# Patient Record
Sex: MALE | Race: WHITE | NOT HISPANIC OR LATINO
[De-identification: names, ages, dates, MRNs, and addresses within clinical notes are randomized per-mention and may not be internally consistent; named-entity substitution may affect disease eponyms.]

---

## 2017-06-20 ENCOUNTER — APPOINTMENT (OUTPATIENT)
Dept: OPHTHALMOLOGY | Facility: CLINIC | Age: 82
End: 2017-06-20

## 2017-06-20 ENCOUNTER — LABORATORY RESULT (OUTPATIENT)
Age: 82
End: 2017-06-20

## 2017-07-05 ENCOUNTER — APPOINTMENT (OUTPATIENT)
Dept: OPHTHALMOLOGY | Facility: CLINIC | Age: 82
End: 2017-07-05

## 2017-08-09 ENCOUNTER — APPOINTMENT (OUTPATIENT)
Dept: OPHTHALMOLOGY | Facility: CLINIC | Age: 82
End: 2017-08-09
Payer: COMMERCIAL

## 2017-08-09 PROCEDURE — 92012 INTRM OPH EXAM EST PATIENT: CPT

## 2017-08-09 PROCEDURE — 92134 CPTRZ OPH DX IMG PST SGM RTA: CPT

## 2017-09-14 ENCOUNTER — APPOINTMENT (OUTPATIENT)
Dept: OPHTHALMOLOGY | Facility: CLINIC | Age: 82
End: 2017-09-14
Payer: COMMERCIAL

## 2017-09-14 PROCEDURE — 92226: CPT | Mod: LT

## 2017-09-14 PROCEDURE — 92134 CPTRZ OPH DX IMG PST SGM RTA: CPT

## 2017-09-14 PROCEDURE — 92012 INTRM OPH EXAM EST PATIENT: CPT

## 2017-10-19 ENCOUNTER — APPOINTMENT (OUTPATIENT)
Dept: OPHTHALMOLOGY | Facility: CLINIC | Age: 82
End: 2017-10-19
Payer: COMMERCIAL

## 2017-10-19 PROCEDURE — 92012 INTRM OPH EXAM EST PATIENT: CPT

## 2017-11-16 ENCOUNTER — APPOINTMENT (OUTPATIENT)
Dept: OPHTHALMOLOGY | Facility: CLINIC | Age: 82
End: 2017-11-16
Payer: COMMERCIAL

## 2017-11-16 PROCEDURE — 92012 INTRM OPH EXAM EST PATIENT: CPT

## 2017-11-16 PROCEDURE — 92134 CPTRZ OPH DX IMG PST SGM RTA: CPT

## 2017-11-16 PROCEDURE — 92226: CPT | Mod: RT

## 2017-12-15 ENCOUNTER — APPOINTMENT (OUTPATIENT)
Dept: OPHTHALMOLOGY | Facility: CLINIC | Age: 82
End: 2017-12-15
Payer: COMMERCIAL

## 2017-12-15 DIAGNOSIS — H35.372 PUCKERING OF MACULA, LEFT EYE: ICD-10-CM

## 2017-12-15 DIAGNOSIS — H35.352 CYSTOID MACULAR DEGENERATION, LEFT EYE: ICD-10-CM

## 2017-12-15 DIAGNOSIS — H35.3130 NONEXUDATIVE AGE-RELATED MACULAR DEGENERATION, BILATERAL, STAGE UNSPECIFIED: ICD-10-CM

## 2017-12-15 DIAGNOSIS — H34.12 CENTRAL RETINAL ARTERY OCCLUSION, LEFT EYE: ICD-10-CM

## 2017-12-15 DIAGNOSIS — Z96.1 PRESENCE OF INTRAOCULAR LENS: ICD-10-CM

## 2017-12-15 DIAGNOSIS — H43.813 VITREOUS DEGENERATION, BILATERAL: ICD-10-CM

## 2017-12-15 PROCEDURE — 92012 INTRM OPH EXAM EST PATIENT: CPT

## 2018-01-05 VITALS
TEMPERATURE: 97 F | WEIGHT: 179.9 LBS | OXYGEN SATURATION: 96 % | SYSTOLIC BLOOD PRESSURE: 161 MMHG | HEART RATE: 81 BPM | DIASTOLIC BLOOD PRESSURE: 100 MMHG | RESPIRATION RATE: 16 BRPM

## 2018-01-05 LAB
ALBUMIN SERPL ELPH-MCNC: 3.6 G/DL — SIGNIFICANT CHANGE UP (ref 3.3–5)
ALP SERPL-CCNC: 119 U/L — SIGNIFICANT CHANGE UP (ref 40–120)
ALT FLD-CCNC: 9 U/L — LOW (ref 10–45)
ANION GAP SERPL CALC-SCNC: 18 MMOL/L — HIGH (ref 5–17)
AST SERPL-CCNC: 27 U/L — SIGNIFICANT CHANGE UP (ref 10–40)
BASE EXCESS BLDV CALC-SCNC: 0.8 MMOL/L — SIGNIFICANT CHANGE UP
BASOPHILS NFR BLD AUTO: 0.2 % — SIGNIFICANT CHANGE UP (ref 0–2)
BILIRUB SERPL-MCNC: 0.6 MG/DL — SIGNIFICANT CHANGE UP (ref 0.2–1.2)
BUN SERPL-MCNC: 25 MG/DL — HIGH (ref 7–23)
CA-I SERPL-SCNC: 1.13 MMOL/L — SIGNIFICANT CHANGE UP (ref 1.12–1.3)
CALCIUM SERPL-MCNC: 9.7 MG/DL — SIGNIFICANT CHANGE UP (ref 8.4–10.5)
CHLORIDE SERPL-SCNC: 103 MMOL/L — SIGNIFICANT CHANGE UP (ref 96–108)
CK MB CFR SERPL CALC: 4.5 NG/ML — SIGNIFICANT CHANGE UP (ref 0–6.7)
CK SERPL-CCNC: 485 U/L — HIGH (ref 30–200)
CO2 SERPL-SCNC: 24 MMOL/L — SIGNIFICANT CHANGE UP (ref 22–31)
CREAT SERPL-MCNC: 1.13 MG/DL — SIGNIFICANT CHANGE UP (ref 0.5–1.3)
EOSINOPHIL NFR BLD AUTO: 0.3 % — SIGNIFICANT CHANGE UP (ref 0–6)
ETHANOL SERPL-MCNC: <10 MG/DL — SIGNIFICANT CHANGE UP (ref 0–10)
GAS PNL BLDV: 142 MMOL/L — SIGNIFICANT CHANGE UP (ref 138–146)
GAS PNL BLDV: SIGNIFICANT CHANGE UP
GAS PNL BLDV: SIGNIFICANT CHANGE UP
GLUCOSE SERPL-MCNC: 102 MG/DL — HIGH (ref 70–99)
HCO3 BLDV-SCNC: 25 MMOL/L — SIGNIFICANT CHANGE UP (ref 20–27)
HCT VFR BLD CALC: 35.8 % — LOW (ref 39–50)
HGB BLD-MCNC: 11.6 G/DL — LOW (ref 13–17)
LACTATE SERPL-SCNC: 1.2 MMOL/L — SIGNIFICANT CHANGE UP (ref 0.5–2)
LYMPHOCYTES # BLD AUTO: 12.9 % — LOW (ref 13–44)
MCHC RBC-ENTMCNC: 28.6 PG — SIGNIFICANT CHANGE UP (ref 27–34)
MCHC RBC-ENTMCNC: 32.4 G/DL — SIGNIFICANT CHANGE UP (ref 32–36)
MCV RBC AUTO: 88.4 FL — SIGNIFICANT CHANGE UP (ref 80–100)
MONOCYTES NFR BLD AUTO: 11 % — SIGNIFICANT CHANGE UP (ref 2–14)
NEUTROPHILS NFR BLD AUTO: 75.6 % — SIGNIFICANT CHANGE UP (ref 43–77)
PCO2 BLDV: 38 MMHG — LOW (ref 41–51)
PH BLDV: 7.43 — SIGNIFICANT CHANGE UP (ref 7.32–7.43)
PLATELET # BLD AUTO: 351 K/UL — SIGNIFICANT CHANGE UP (ref 150–400)
PO2 BLDV: 74 MMHG — SIGNIFICANT CHANGE UP
POTASSIUM BLDV-SCNC: 3.6 MMOL/L — SIGNIFICANT CHANGE UP (ref 3.5–4.9)
POTASSIUM SERPL-MCNC: 3.7 MMOL/L — SIGNIFICANT CHANGE UP (ref 3.5–5.3)
POTASSIUM SERPL-SCNC: 3.7 MMOL/L — SIGNIFICANT CHANGE UP (ref 3.5–5.3)
PROT SERPL-MCNC: 7.7 G/DL — SIGNIFICANT CHANGE UP (ref 6–8.3)
RBC # BLD: 4.05 M/UL — LOW (ref 4.2–5.8)
RBC # FLD: 14.8 % — SIGNIFICANT CHANGE UP (ref 10.3–16.9)
SAO2 % BLDV: 94 % — SIGNIFICANT CHANGE UP
SODIUM SERPL-SCNC: 145 MMOL/L — SIGNIFICANT CHANGE UP (ref 135–145)
TROPONIN T SERPL-MCNC: 0.02 NG/ML — HIGH (ref 0–0.01)
WBC # BLD: 12.1 K/UL — HIGH (ref 3.8–10.5)
WBC # FLD AUTO: 12.1 K/UL — HIGH (ref 3.8–10.5)

## 2018-01-05 PROCEDURE — 70450 CT HEAD/BRAIN W/O DYE: CPT | Mod: 26

## 2018-01-05 PROCEDURE — 71045 X-RAY EXAM CHEST 1 VIEW: CPT | Mod: 26

## 2018-01-05 PROCEDURE — 71260 CT THORAX DX C+: CPT | Mod: 26

## 2018-01-05 PROCEDURE — 73110 X-RAY EXAM OF WRIST: CPT | Mod: 26

## 2018-01-05 PROCEDURE — 74177 CT ABD & PELVIS W/CONTRAST: CPT | Mod: 26

## 2018-01-05 PROCEDURE — 73502 X-RAY EXAM HIP UNI 2-3 VIEWS: CPT | Mod: 26,RT

## 2018-01-05 PROCEDURE — 99291 CRITICAL CARE FIRST HOUR: CPT | Mod: 25

## 2018-01-05 PROCEDURE — 93010 ELECTROCARDIOGRAM REPORT: CPT

## 2018-01-05 PROCEDURE — 93010 ELECTROCARDIOGRAM REPORT: CPT | Mod: 77

## 2018-01-05 RX ORDER — TETANUS TOXOID, REDUCED DIPHTHERIA TOXOID AND ACELLULAR PERTUSSIS VACCINE, ADSORBED 5; 2.5; 8; 8; 2.5 [IU]/.5ML; [IU]/.5ML; UG/.5ML; UG/.5ML; UG/.5ML
0.5 SUSPENSION INTRAMUSCULAR ONCE
Qty: 0 | Refills: 0 | Status: COMPLETED | OUTPATIENT
Start: 2018-01-05 | End: 2018-01-05

## 2018-01-05 RX ORDER — SODIUM CHLORIDE 9 MG/ML
1000 INJECTION INTRAMUSCULAR; INTRAVENOUS; SUBCUTANEOUS ONCE
Qty: 0 | Refills: 0 | Status: COMPLETED | OUTPATIENT
Start: 2018-01-05 | End: 2018-01-05

## 2018-01-05 RX ADMIN — SODIUM CHLORIDE 1000 MILLILITER(S): 9 INJECTION INTRAMUSCULAR; INTRAVENOUS; SUBCUTANEOUS at 22:24

## 2018-01-05 NOTE — ED PROVIDER NOTE - SHIFT CHANGE DETAILS
await CT head/ chest abdomen-  if neg for acute process may admit to medicine - mild trop leak  no acute ekg changes- pt is stable for floor

## 2018-01-05 NOTE — ED PROVIDER NOTE - CRITICAL CARE PROVIDED
interpretation of diagnostic studies/telephone consultation with the patient's family/additional history taking/consultation with other physicians/direct patient care (not related to procedure)/documentation

## 2018-01-05 NOTE — ED PROVIDER NOTE - CRANIAL NERVE AND PUPILLARY EXAM
corneal reflex intact/peripheral vision intact/cranial nerves 2-12 intact/central and peripheral vision intact/extra-ocular movements intact/gag reflex intact/tongue is midline

## 2018-01-05 NOTE — ED PROVIDER NOTE - OBJECTIVE STATEMENT
85 yo male with hx of prostate cancer- last seen nl 2 days ago per Super- found on floor with stool on his underwear  no headache or N/V  no abd pain? fall ? -- no prior known hx of CVA or MI -- ? treatment for alleged prostate cancer ?  alert to person, not place or time- noted abrasion to right buttocks-  can move all ext x 4--  no facial droop no focal weakness  last tet? 83 yo male lives alone-but --with hx of ? prostate cancer- last seen nl 2 days ago per Super- found on floor with stool on his underwear  no headache or N/V  no abd pain? fall ? -- no prior known hx of CVA or MI -- ? treatment for alleged prostate cancer ?  alert to person, not place or time- noted abrasion to right buttocks-  can move all ext x 4--  no facial droop no focal weakness --- pt claims he fell 2 weeks ago- injured his wrist and hit his head ? loc ? last tet 83 yo male lives alone-but --with hx of ? prostate cancer- last seen nl 2 days ago per Super- found on floor with stool on his underwear  no headache or N/V  no abd pain? fall ? -- no prior known hx of CVA or MI -- ? treatment for alleged prostate cancer ?  alert to person, not place or time- noted abrasion to right buttocks-  can move all ext x 4--  no facial droop no focal weakness --- pt claims he fell 2 weeks ago- injured his wrist and hit his head ? loc ? last tet  -no prior hx of seizure MI or CVA

## 2018-01-05 NOTE — ED PROVIDER NOTE - MEDICAL DECISION MAKING DETAILS
84 male lives alone last seen nl 2 days ago- unwit fall - lower susp for syncope  unkempt  no acute ischemic changes - poor hygeine ? head trauma  -appears dehydrated await labs- Ct head to eval for CVA  no slurred speech but confused alert to person not place or time  knew it was 2018 and January

## 2018-01-05 NOTE — ED ADULT NURSE NOTE - CHIEF COMPLAINT QUOTE
BIBA as AMS; per EMS found by karlie on the floor of his apt  (hasn't seen by karlie for days) ; AAOx 2 ; no weakness, speech is clear; denies any pain , no noted injury; fingerstick by

## 2018-01-05 NOTE — ED ADULT TRIAGE NOTE - CHIEF COMPLAINT QUOTE
BIBA as AMS; per EMS found by karlie on the floor of his apt; AAOx 2 ; no weakness, speech is clear; denies any pain , no noted injury; fingerstick by  BIBA as AMS; per EMS found by karlie on the floor of his apt  (hasn't seen by karlie for days) ; AAOx 2 ; no weakness, speech is clear; denies any pain , no noted injury; fingerstick by

## 2018-01-05 NOTE — ED ADULT NURSE NOTE - OBJECTIVE STATEMENT
pt had not been seen by his doorman for 2 days and EMS was called for a wellness check and found pt sitting on floor in apartment.  pt arrives stating that he was in his nurse's apartment and this is just a mistake.  he states he is at [facility] and it is january 2017.  pt follows commands.  speech clear.  moves all ext equally.  inc of urine.  pt denies pain.

## 2018-01-06 ENCOUNTER — INPATIENT (INPATIENT)
Facility: HOSPITAL | Age: 83
LOS: 3 days | Discharge: NURSING, REHAB OR LONG-TERM CARE | DRG: 871 | End: 2018-01-10
Attending: STUDENT IN AN ORGANIZED HEALTH CARE EDUCATION/TRAINING PROGRAM | Admitting: STUDENT IN AN ORGANIZED HEALTH CARE EDUCATION/TRAINING PROGRAM
Payer: MEDICARE

## 2018-01-06 DIAGNOSIS — R33.9 RETENTION OF URINE, UNSPECIFIED: ICD-10-CM

## 2018-01-06 DIAGNOSIS — M48.00 SPINAL STENOSIS, SITE UNSPECIFIED: ICD-10-CM

## 2018-01-06 DIAGNOSIS — R63.8 OTHER SYMPTOMS AND SIGNS CONCERNING FOOD AND FLUID INTAKE: ICD-10-CM

## 2018-01-06 DIAGNOSIS — Z29.9 ENCOUNTER FOR PROPHYLACTIC MEASURES, UNSPECIFIED: ICD-10-CM

## 2018-01-06 DIAGNOSIS — F10.10 ALCOHOL ABUSE, UNCOMPLICATED: ICD-10-CM

## 2018-01-06 DIAGNOSIS — A41.9 SEPSIS, UNSPECIFIED ORGANISM: ICD-10-CM

## 2018-01-06 DIAGNOSIS — N39.0 URINARY TRACT INFECTION, SITE NOT SPECIFIED: ICD-10-CM
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
## 2018-01-06 DIAGNOSIS — I10 ESSENTIAL (PRIMARY) HYPERTENSION: ICD-10-CM

## 2018-01-06 DIAGNOSIS — G93.40 ENCEPHALOPATHY, UNSPECIFIED: ICD-10-CM

## 2018-01-06 DIAGNOSIS — G92 TOXIC ENCEPHALOPATHY: ICD-10-CM

## 2018-01-06 DIAGNOSIS — E78.5 HYPERLIPIDEMIA, UNSPECIFIED: ICD-10-CM

## 2018-01-06 DIAGNOSIS — E86.0 DEHYDRATION: ICD-10-CM

## 2018-01-06 DIAGNOSIS — G40.909 EPILEPSY, UNSPECIFIED, NOT INTRACTABLE, WITHOUT STATUS EPILEPTICUS: ICD-10-CM

## 2018-01-06 LAB
AMPHET UR-MCNC: NEGATIVE — SIGNIFICANT CHANGE UP
ANION GAP SERPL CALC-SCNC: 16 MMOL/L — SIGNIFICANT CHANGE UP (ref 5–17)
APPEARANCE UR: (no result)
BACTERIA # UR AUTO: (no result) /HPF
BARBITURATES UR SCN-MCNC: NEGATIVE — SIGNIFICANT CHANGE UP
BENZODIAZ UR-MCNC: NEGATIVE — SIGNIFICANT CHANGE UP
BILIRUB UR-MCNC: (no result)
BUN SERPL-MCNC: 24 MG/DL — HIGH (ref 7–23)
CALCIUM SERPL-MCNC: 8.7 MG/DL — SIGNIFICANT CHANGE UP (ref 8.4–10.5)
CHLORIDE SERPL-SCNC: 105 MMOL/L — SIGNIFICANT CHANGE UP (ref 96–108)
CO2 SERPL-SCNC: 22 MMOL/L — SIGNIFICANT CHANGE UP (ref 22–31)
COCAINE METAB.OTHER UR-MCNC: NEGATIVE — SIGNIFICANT CHANGE UP
COLOR SPEC: YELLOW — SIGNIFICANT CHANGE UP
CREAT SERPL-MCNC: 1.02 MG/DL — SIGNIFICANT CHANGE UP (ref 0.5–1.3)
DIFF PNL FLD: NEGATIVE — SIGNIFICANT CHANGE UP
EPI CELLS # UR: (no result) /HPF (ref 0–5)
GLUCOSE SERPL-MCNC: 98 MG/DL — SIGNIFICANT CHANGE UP (ref 70–99)
GLUCOSE UR QL: NEGATIVE — SIGNIFICANT CHANGE UP
HCT VFR BLD CALC: 33 % — LOW (ref 39–50)
HGB BLD-MCNC: 10.5 G/DL — LOW (ref 13–17)
KETONES UR-MCNC: (no result) MG/DL
LEUKOCYTE ESTERASE UR-ACNC: (no result)
MAGNESIUM SERPL-MCNC: 1.8 MG/DL — SIGNIFICANT CHANGE UP (ref 1.6–2.6)
MCHC RBC-ENTMCNC: 28.6 PG — SIGNIFICANT CHANGE UP (ref 27–34)
MCHC RBC-ENTMCNC: 31.8 G/DL — LOW (ref 32–36)
MCV RBC AUTO: 89.9 FL — SIGNIFICANT CHANGE UP (ref 80–100)
METHADONE UR-MCNC: NEGATIVE — SIGNIFICANT CHANGE UP
NITRITE UR-MCNC: NEGATIVE — SIGNIFICANT CHANGE UP
OPIATES UR-MCNC: NEGATIVE — SIGNIFICANT CHANGE UP
PCP SPEC-MCNC: SIGNIFICANT CHANGE UP
PCP UR-MCNC: NEGATIVE — SIGNIFICANT CHANGE UP
PH UR: 6.5 — SIGNIFICANT CHANGE UP (ref 5–8)
PLATELET # BLD AUTO: 311 K/UL — SIGNIFICANT CHANGE UP (ref 150–400)
POTASSIUM SERPL-MCNC: 3.4 MMOL/L — LOW (ref 3.5–5.3)
POTASSIUM SERPL-SCNC: 3.4 MMOL/L — LOW (ref 3.5–5.3)
PROT UR-MCNC: 30 MG/DL
RBC # BLD: 3.67 M/UL — LOW (ref 4.2–5.8)
RBC # FLD: 14.9 % — SIGNIFICANT CHANGE UP (ref 10.3–16.9)
RBC CASTS # UR COMP ASSIST: (no result) /HPF
SODIUM SERPL-SCNC: 143 MMOL/L — SIGNIFICANT CHANGE UP (ref 135–145)
SP GR SPEC: 1.02 — SIGNIFICANT CHANGE UP (ref 1–1.03)
THC UR QL: NEGATIVE — SIGNIFICANT CHANGE UP
TROPONIN T SERPL-MCNC: 0.02 NG/ML — HIGH (ref 0–0.01)
TROPONIN T SERPL-MCNC: 0.02 NG/ML — HIGH (ref 0–0.01)
TSH SERPL-MCNC: 3.33 UIU/ML — SIGNIFICANT CHANGE UP (ref 0.35–4.94)
UROBILINOGEN FLD QL: 0.2 E.U./DL — SIGNIFICANT CHANGE UP
VIT B12 SERPL-MCNC: 242 PG/ML — SIGNIFICANT CHANGE UP (ref 232–1245)
WBC # BLD: 11.6 K/UL — HIGH (ref 3.8–10.5)
WBC # FLD AUTO: 11.6 K/UL — HIGH (ref 3.8–10.5)
WBC UR QL: (no result) /HPF

## 2018-01-06 PROCEDURE — 72146 MRI CHEST SPINE W/O DYE: CPT | Mod: 26

## 2018-01-06 PROCEDURE — 99223 1ST HOSP IP/OBS HIGH 75: CPT

## 2018-01-06 PROCEDURE — 99223 1ST HOSP IP/OBS HIGH 75: CPT | Mod: GC

## 2018-01-06 PROCEDURE — 72148 MRI LUMBAR SPINE W/O DYE: CPT | Mod: 26

## 2018-01-06 PROCEDURE — 95816 EEG AWAKE AND DROWSY: CPT | Mod: 26

## 2018-01-06 RX ORDER — HEPARIN SODIUM 5000 [USP'U]/ML
5000 INJECTION INTRAVENOUS; SUBCUTANEOUS EVERY 8 HOURS
Qty: 0 | Refills: 0 | Status: DISCONTINUED | OUTPATIENT
Start: 2018-01-06 | End: 2018-01-10

## 2018-01-06 RX ORDER — ATORVASTATIN CALCIUM 80 MG/1
1 TABLET, FILM COATED ORAL
Qty: 0 | Refills: 0 | COMMUNITY

## 2018-01-06 RX ORDER — ATORVASTATIN CALCIUM 80 MG/1
40 TABLET, FILM COATED ORAL AT BEDTIME
Qty: 0 | Refills: 0 | Status: DISCONTINUED | OUTPATIENT
Start: 2018-01-06 | End: 2018-01-10

## 2018-01-06 RX ORDER — ALLOPURINOL 300 MG
1 TABLET ORAL
Qty: 0 | Refills: 0 | COMMUNITY

## 2018-01-06 RX ORDER — SODIUM CHLORIDE 9 MG/ML
1000 INJECTION, SOLUTION INTRAVENOUS
Qty: 0 | Refills: 0 | Status: DISCONTINUED | OUTPATIENT
Start: 2018-01-06 | End: 2018-01-08

## 2018-01-06 RX ORDER — DILTIAZEM HCL 120 MG
300 CAPSULE, EXT RELEASE 24 HR ORAL DAILY
Qty: 0 | Refills: 0 | Status: DISCONTINUED | OUTPATIENT
Start: 2018-01-06 | End: 2018-01-09

## 2018-01-06 RX ORDER — FOLIC ACID 0.8 MG
1 TABLET ORAL DAILY
Qty: 0 | Refills: 0 | Status: DISCONTINUED | OUTPATIENT
Start: 2018-01-07 | End: 2018-01-10

## 2018-01-06 RX ORDER — AMLODIPINE BESYLATE 2.5 MG/1
2.5 TABLET ORAL DAILY
Qty: 0 | Refills: 0 | Status: DISCONTINUED | OUTPATIENT
Start: 2018-01-06 | End: 2018-01-10

## 2018-01-06 RX ORDER — ALLOPURINOL 300 MG
100 TABLET ORAL DAILY
Qty: 0 | Refills: 0 | Status: DISCONTINUED | OUTPATIENT
Start: 2018-01-06 | End: 2018-01-10

## 2018-01-06 RX ORDER — CEFTRIAXONE 500 MG/1
1 INJECTION, POWDER, FOR SOLUTION INTRAMUSCULAR; INTRAVENOUS EVERY 24 HOURS
Qty: 0 | Refills: 0 | Status: DISCONTINUED | OUTPATIENT
Start: 2018-01-06 | End: 2018-01-08

## 2018-01-06 RX ORDER — OLMESARTAN MEDOXOMIL 5 MG/1
1 TABLET, FILM COATED ORAL
Qty: 0 | Refills: 0 | COMMUNITY

## 2018-01-06 RX ORDER — THIAMINE MONONITRATE (VIT B1) 100 MG
500 TABLET ORAL EVERY 8 HOURS
Qty: 0 | Refills: 0 | Status: DISCONTINUED | OUTPATIENT
Start: 2018-01-06 | End: 2018-01-09

## 2018-01-06 RX ORDER — MAGNESIUM SULFATE 500 MG/ML
1 VIAL (ML) INJECTION ONCE
Qty: 0 | Refills: 0 | Status: COMPLETED | OUTPATIENT
Start: 2018-01-06 | End: 2018-01-06

## 2018-01-06 RX ORDER — POTASSIUM CHLORIDE 20 MEQ
40 PACKET (EA) ORAL EVERY 4 HOURS
Qty: 0 | Refills: 0 | Status: DISCONTINUED | OUTPATIENT
Start: 2018-01-06 | End: 2018-01-07

## 2018-01-06 RX ORDER — SODIUM CHLORIDE 9 MG/ML
1000 INJECTION INTRAMUSCULAR; INTRAVENOUS; SUBCUTANEOUS
Qty: 0 | Refills: 0 | Status: DISCONTINUED | OUTPATIENT
Start: 2018-01-06 | End: 2018-01-06

## 2018-01-06 RX ORDER — AMLODIPINE BESYLATE 2.5 MG/1
1 TABLET ORAL
Qty: 0 | Refills: 0 | COMMUNITY

## 2018-01-06 RX ADMIN — TETANUS TOXOID, REDUCED DIPHTHERIA TOXOID AND ACELLULAR PERTUSSIS VACCINE, ADSORBED 0.5 MILLILITER(S): 5; 2.5; 8; 8; 2.5 SUSPENSION INTRAMUSCULAR at 01:30

## 2018-01-06 RX ADMIN — Medication 300 MILLIGRAM(S): at 06:48

## 2018-01-06 RX ADMIN — Medication 100 MILLIGRAM(S): at 12:08

## 2018-01-06 RX ADMIN — Medication 105 MILLIGRAM(S): at 21:26

## 2018-01-06 RX ADMIN — SODIUM CHLORIDE 100 MILLILITER(S): 9 INJECTION, SOLUTION INTRAVENOUS at 07:50

## 2018-01-06 RX ADMIN — Medication 105 MILLIGRAM(S): at 14:42

## 2018-01-06 RX ADMIN — SODIUM CHLORIDE 100 MILLILITER(S): 9 INJECTION INTRAMUSCULAR; INTRAVENOUS; SUBCUTANEOUS at 03:34

## 2018-01-06 RX ADMIN — CEFTRIAXONE 100 GRAM(S): 500 INJECTION, POWDER, FOR SOLUTION INTRAMUSCULAR; INTRAVENOUS at 07:12

## 2018-01-06 RX ADMIN — Medication 105 MILLIGRAM(S): at 07:28

## 2018-01-06 RX ADMIN — ATORVASTATIN CALCIUM 40 MILLIGRAM(S): 80 TABLET, FILM COATED ORAL at 21:26

## 2018-01-06 RX ADMIN — Medication 40 MILLIEQUIVALENT(S): at 21:26

## 2018-01-06 RX ADMIN — HEPARIN SODIUM 5000 UNIT(S): 5000 INJECTION INTRAVENOUS; SUBCUTANEOUS at 14:06

## 2018-01-06 RX ADMIN — HEPARIN SODIUM 5000 UNIT(S): 5000 INJECTION INTRAVENOUS; SUBCUTANEOUS at 21:26

## 2018-01-06 RX ADMIN — Medication 40 MILLIEQUIVALENT(S): at 17:21

## 2018-01-06 RX ADMIN — AMLODIPINE BESYLATE 2.5 MILLIGRAM(S): 2.5 TABLET ORAL at 06:48

## 2018-01-06 RX ADMIN — Medication 40 MILLIEQUIVALENT(S): at 14:06

## 2018-01-06 RX ADMIN — Medication 40 MILLIEQUIVALENT(S): at 12:04

## 2018-01-06 RX ADMIN — Medication 100 GRAM(S): at 12:04

## 2018-01-06 NOTE — H&P ADULT - NSHPPHYSICALEXAM_GEN_ALL_CORE
.  VITAL SIGNS:  T(C): 36.4 (01-06-18 @ 01:57), Max: 36.4 (01-06-18 @ 01:57)  T(F): 97.6 (01-06-18 @ 01:57), Max: 97.6 (01-06-18 @ 01:57)  HR: 75 (01-06-18 @ 01:57) (74 - 81)  BP: 180/83 (01-06-18 @ 01:57) (161/100 - 190/76)  BP(mean): --  RR: 18 (01-06-18 @ 01:57) (16 - 18)  SpO2: 98% (01-06-18 @ 01:57) (96% - 99%)  Wt(kg): --    PHYSICAL EXAM:    General: NAD, comfortable, pleasant, smiley, non-toxic appearing  HEENT: NCAT, PERRL, clear conjunctiva, no scleral icterus  Neck: supple, no JVD  Respiratory: CTA b/l, no wheezing, rhonchi, rales  Cardiovascular: RRR, normal S1S2, no M/R/G  Abdomen: soft, NT/ND, bowel sounds in all four quadrants, no palpable masses  Extremities: WWP, no clubbing, cyanosis, or edema  Neuro: AOx3, although confused and rambling about nonsense at times  Psych: appropriate affect  : with bowles catheter putting out dark yellow urine .  VITAL SIGNS:  T(C): 36.4 (01-06-18 @ 01:57), Max: 36.4 (01-06-18 @ 01:57)  T(F): 97.6 (01-06-18 @ 01:57), Max: 97.6 (01-06-18 @ 01:57)  HR: 75 (01-06-18 @ 01:57) (74 - 81)  BP: 180/83 (01-06-18 @ 01:57) (161/100 - 190/76)  BP(mean): --  RR: 18 (01-06-18 @ 01:57) (16 - 18)  SpO2: 98% (01-06-18 @ 01:57) (96% - 99%)  Wt(kg): --    PHYSICAL EXAM:    General: NAD, comfortable, pleasant, smiley, non-toxic appearing  HEENT: NCAT, PERRL, clear conjunctiva, no scleral icterus  Neck: supple, no JVD  Respiratory: CTA b/l, no wheezing, rhonchi, rales  Cardiovascular: RRR, normal S1S2, no M/R/G  Abdomen: soft, NT/ND, bowel sounds in all four quadrants, no palpable masses  Extremities: WWP, no clubbing, cyanosis, or edema  Neuro: AOx3, although confused and rambling about nonsense at times, CNII-XII intact, 5/5 strength in UE, 3/5 strength in LE b/l. Sensory intact. Extremities with marked rigidity. UE with baseline tremor.   Psych: appropriate affect  : with bowles catheter putting out dark yellow urine

## 2018-01-06 NOTE — H&P ADULT - PROBLEM SELECTOR PLAN 6
History of HLD.  - Continue with atorvastatin 40mg qhs. History of HTN. BP systolic ranging 160-190 in ED. Verified medications with pharmacy: dilt ER 300mg qd, amlodipine 2.5mg, olmesartan 40mg qd.  - Restarting diltiazem ER 300mg qd and amlodipine 2.5mg in AM.  - Consider starting losartan 100mg if patient remains HTN >180, as patient is on olmesartan 40mg at home (via therapeutic interchange). Patient with recent reported fall at home. Etiology likely mechanical in nature. Differential includes seizures as patient Less likely cardiogenic given recent normal TTE. Unlikely arrhythmogenic given EKG NSR.  - PT consulted.  - Check orthostatics in AM.

## 2018-01-06 NOTE — H&P ADULT - HISTORY OF PRESENT ILLNESS
85 y/o M with PMHx of HTN, HLD,     lives alone  prostate  2 days ago   today covered in stool  person place, not time  moves extremities    2 weeks fell, inj risk    pan scan: L1      PMHx: as above  Surg hx: as above  Meds: allopurinol, atorvastatin, benicar, amlodipine, travatan, taztia  All: NKDA  Soc hx: 10 glasses of wine/week.   Fhx: non-contributory    PMD Dr. Romero Alvarado  TTE 8/2017: normal LV size/function, no WMA.   Labs Hgb baseline 12.6  Cr baseline 1.21  HLD chol 214      Recent L eyesight diminished?  Walks with cane slowly 83 y/o M with PMHx of HTN, HLD, ?prostate cancer history, phimosis of the penis, presents to ED with AMS. Patient lives alone, walks with a cane. Was last normal two days ago as per superintendent. Today was found covered in stool. Reports a recent fall two weeks ago during which he injured his wrist.    In the ED, VS T    PMHx: as above  Surg hx: as above  Meds: allopurinol, atorvastatin, benicar, amlodipine, travatan, taztia  All: NKDA  Soc hx: 10 glasses of wine/week.   Fhx: non-contributory    Additional information obtained via outpatient [employer] notes:  TTE 8/2017: normal LV size/function, no WMA.   Labs Hgb baseline 12.6  Cr baseline 1.21  Total chol 214    Recent L eyesight diminished?  Walks with cane slowly 85 y/o M poor historian with PMHx of HTN, HLD, ?prostate cancer history, phimosis of the penis, presents to ED with AMS. Patient lives alone, walks with a cane. Was last normal two days ago as per superintendent. Today was found covered in stool. Reports a recent fall two weeks ago where he tripped on his rug. Does not recall hitting his head or losing consciousness. Otherwise denies fever, chills, lightheadedness, CP, palpitations, SOB, cough, URI symptoms, N/V/D/C, abdominal pain, dysuria, hematuria, changes in bowel habits, LE edema. States that he was recently at [facility] for a "blood exchange because of the allopurinol" and "the blood in his sugar."    In the ED, VS T 97.2, HR 81, /100, RR 16, O2 96 on RA.    Labs notable for leukocytosis to 12.1, Hgb 11.6,   BUN/Cr 25/1.13, , trop 0.02. UA cloudy, 1.020, with protein, and leuk est.  EtOH negative.  PMHx: as above  Surg hx: as above  Meds: allopurinol 100 qd, atorvastatin 40 qhs, dilt  qd, amlodipine 2.5 qd, olmasartan 40mg qd (confirmed with Duane Reade)  All: NKDA  Soc hx: former smoker, smoked 20 years x 2ppd, 10 glasses of wine/week. No recreational drugs.  Fhx: non-contributory    Additional information obtained via outpatient Allscripts notes:  TTE 8/2017: normal LV size/function, no WMA.   Labs Hgb baseline 12.6  Cr baseline 1.21  Total chol 214 85 y/o M poor historian with PMHx of HTN, HLD, ?prostate cancer history, phimosis of the penis, presents to ED with AMS. Patient lives alone, walks with a cane. Was last normal two days ago as per superintendent. Today was found covered in stool. Reports a recent fall two weeks ago where he tripped on his rug. Does not recall hitting his head or losing consciousness. Otherwise denies fever, chills, lightheadedness, CP, palpitations, SOB, cough, URI symptoms, N/V/D/C, abdominal pain, dysuria, hematuria, changes in bowel habits, LE edema. States that he was recently at [facility] for a "blood exchange because of the allopurinol" and "the blood in his sugar."    In the ED, VS T 97.2, HR 81, /100, RR 16, O2 96 on RA. Labs notable for leukocytosis to 12.1, Hgb 11.6, BUN/Cr 25/1.13, , trop 0.02. UA cloudy, 1.020, with protein, and trace leuk esterase. EtOH negative. U tox pending. Given 1L NS bolus x 2 in ED.    PMHx: as above  Surg hx: as above  Meds: allopurinol 100 qd, atorvastatin 40 qhs, dilt  qd, amlodipine 2.5 qd, olmasartan 40mg qd (confirmed with Duane Reade)  All: NKDA  Soc hx: former smoker, smoked 20 years x 2ppd, 10 glasses of wine/week. No recreational drugs.  Fhx: non-contributory    Additional information obtained via outpatient Allscripts notes:  TTE 8/2017: normal LV size/function, no WMA.   Labs Hgb baseline 12.6  Cr baseline 1.21  Total chol 214 85 y/o M poor historian with PMHx of HTN, HLD, ?prostate cancer history, phimosis of the penis, BIBEMS with AMS x 1 day. Patient lives alone, walks with a cane. Was last normal two days ago as per superintendent. Today was found covered in stool. Reports a recent fall two weeks ago where he tripped on his rug. Does not recall hitting his head or losing consciousness. Otherwise denies fever, chills, lightheadedness, CP, palpitations, SOB, cough, URI symptoms, N/V/D/C, abdominal pain, dysuria, hematuria, changes in bowel habits, LE edema. States that he was recently at [facility] for a "blood exchange because of the allopurinol" and "the blood in his sugar."    In the ED, VS T 97.2, HR 81, /100, RR 16, O2 96 on RA. Labs notable for leukocytosis to 12.1, Hgb 11.6, BUN/Cr 25/1.13, , trop 0.02. UA cloudy, 1.020, with protein, and trace leuk esterase. EtOH negative. U tox pending. Given 1L NS bolus x 2 in ED.    PMHx: as above  Surg hx: as above  Meds: allopurinol 100 qd, atorvastatin 40 qhs, dilt  qd, amlodipine 2.5 qd, olmasartan 40mg qd (confirmed with Duane Reade)  All: NKDA  Soc hx: former smoker, smoked 20 years x 2ppd, 10 glasses of wine/week. No recreational drugs.  Fhx: non-contributory    Additional information obtained via outpatient Allscripts notes:  TTE 8/2017: normal LV size/function, no WMA.   Labs Hgb baseline 12.6  Cr baseline 1.21  Total chol 214 85 y/o M poor historian with PMHx of HTN, HLD, ?prostate cancer history, phimosis of the penis, BIBEMS with AMS x 1 day. Patient lives alone, usually walks slowly with a cane. Staff had not seen patient in a few days and acquaintances have not been able to reach him for a few hours. Patient was found laying on the floor in his apartment, incontinent of feces, unable to explain how he ended up on the floor. No signs of trauma at the scene, unknown LOC. Was last normal two days ago as per superintendent. Patient reports a recent fall two weeks ago where he tripped on his rug. Does not recall hitting his head or losing consciousness. Otherwise denies fever, chills, lightheadedness, CP, palpitations, SOB, cough, URI symptoms, N/V/D/C, abdominal pain, dysuria, hematuria, changes in bowel habits, LE edema. States that he was recently at [facility] for a "blood exchange because of the allopurinol" and "the blood in his sugar." Reports having a wife who lives in a different apartment because "she works from home."    In the ED, VS T 97.2, HR 81, /100, RR 16, O2 96 on RA. Labs notable for leukocytosis to 12.1, Hgb 11.6, BUN/Cr 25/1.13, , trop 0.02. UA cloudy, 1.020, with protein, and trace leuk esterase. EtOH negative. U tox pending. Given 1L NS bolus x 2 in ED.    PMHx: as above  Surg hx: as above  Meds: allopurinol 100 qd, atorvastatin 40 qhs, dilt  qd, amlodipine 2.5 qd, olmasartan 40mg qd (confirmed with Duane Reade)  All: NKDA  Soc hx: former smoker, smoked 20 years x 2ppd, 10 glasses of wine/week. No recreational drugs.  Fhx: non-contributory    Additional information obtained via outpatient Allscripts notes:  TTE 8/2017: normal LV size/function, no WMA.   Labs Hgb baseline 12.6  Cr baseline 1.21  Total chol 214 85 y/o M poor historian with PMHx of HTN, HLD, ?prostate cancer history, phimosis of the penis, BIBEMS with AMS x 1 day. Patient lives alone, usually walks slowly with a cane. Staff had not seen patient in a few days and acquaintances have not been able to reach him for a few hours. Patient was found laying on the floor in his apartment, incontinent of feces, unable to explain how he ended up on the floor. No signs of trauma at the scene, unknown LOC. Was last normal two days ago as per superintendent. Patient reports a recent fall two weeks ago where he tripped on his rug. Does not recall hitting his head or losing consciousness. Otherwise denies fever, chills, lightheadedness, CP, palpitations, SOB, cough, URI symptoms, N/V/D/C, abdominal pain, dysuria, hematuria, changes in bowel habits, LE edema. States that he was recently at [facility] for a "blood exchange because of the allopurinol" and "the blood in his sugar." Reports having a wife who lives in a different apartment because "she works from home."    In the ED, VS T 97.2, HR 81, /100, RR 16, O2 96 on RA. Labs notable for leukocytosis to 12.1, Hgb 11.6, BUN/Cr 25/1.13, , trop 0.02. EtOH negative. U tox pending. Given 1L NS bolus x 2 in ED.    PMHx: as above  Surg hx: as above  Meds: allopurinol 100 qd, atorvastatin 40 qhs, dilt  qd, amlodipine 2.5 qd, olmasartan 40mg qd (confirmed with Duane Reade)  All: NKDA  Soc hx: former smoker, smoked 20 years x 2ppd, 10 glasses of wine/week. No recreational drugs.  Fhx: non-contributory    Additional information obtained via outpatient Allscripts notes:  TTE 8/2017: normal LV size/function, no WMA.   Labs Hgb baseline 12.6  Cr baseline 1.21  Total chol 214 85 y/o M poor historian with PMHx of HTN, HLD, ?prostate cancer history, ?seizures (pharmacy had keppra as old medication), phimosis of the penis, BIBEMS with AMS x 1 day. Patient lives alone, usually walks slowly with a cane. Staff had not seen patient in a few days and acquaintances have not been able to reach him for a few hours. Patient was found laying on the floor in his apartment, incontinent of feces, unable to explain how he ended up on the floor. No signs of trauma at the scene, unknown LOC. Was last normal two days ago as per superintendent. Patient reports a recent fall two weeks ago where he tripped on his rug. Does not recall hitting his head or losing consciousness. Otherwise denies fever, chills, lightheadedness, CP, palpitations, SOB, cough, URI symptoms, N/V/D/C, abdominal pain, dysuria, hematuria, changes in bowel habits, LE edema. States that he was recently at [facility] for a "blood exchange because of the allopurinol" and "the blood in his sugar." Reports having a wife who lives in a different apartment because "she works from home."    In the ED, VS T 97.2, HR 81, /100, RR 16, O2 96 on RA. Labs notable for leukocytosis to 12.1, Hgb 11.6, BUN/Cr 25/1.13, , trop 0.02. EtOH negative. U tox pending. Given 1L NS bolus x 2 in ED.    PMHx: as above  Surg hx: as above  Meds: allopurinol 100 qd, atorvastatin 40 qhs, dilt  qd, amlodipine 2.5 qd, olmasartan 40mg qd (confirmed with Duane Reade)  All: NKDA  Soc hx: former smoker, smoked 20 years x 2ppd, 10 glasses of wine/week. No recreational drugs.  Fhx: non-contributory    Additional information obtained via outpatient Allscripts notes:  TTE 8/2017: normal LV size/function, no WMA.   Labs Hgb baseline 12.6  Cr baseline 1.21  Total chol 214 85 y/o M poor historian with PMHx of HTN, HLD, ?prostate cancer history, ?seizures (pharmacy had keppra as old medication), phimosis of the penis, BIBEMS with AMS x 1 day. Patient lives alone, usually walks slowly with a cane. Staff had not seen patient in a few days and acquaintances have not been able to reach him for a few hours. Patient was found laying on the floor in his apartment, incontinent of feces, unable to explain how he ended up on the floor. No signs of trauma at the scene, unknown LOC. Was last normal two days ago as per superintendent. Patient reports a recent fall two weeks ago where he tripped on his rug. Does not recall hitting his head or losing consciousness. Otherwise denies fever, chills, lightheadedness, CP, palpitations, SOB, cough, URI symptoms, N/V/D/C, abdominal pain, dysuria, hematuria, changes in bowel habits, LE edema. States that he was recently at [facility] for a "blood exchange because of the allopurinol" and "the blood in his sugar." Reports having a wife who lives in a different apartment because "she works from home."    In the ED, VS T 97.2, HR 81, /100, RR 16, O2 96 on RA. Labs notable for leukocytosis to 12.1, Hgb 11.6, BUN/Cr 25/1.13, , trop 0.02. EtOH negative. U tox pending. Given 1L NS bolus x 2 in ED.    PMHx: as above  Surg hx: as above  Meds: allopurinol 100 qd, atorvastatin 40 qhs, dilt  qd, amlodipine 2.5 qd, olmasartan 40mg qd (confirmed with Duane Reade)  All: NKDA  Soc hx: former smoker, smoked 20 years x 2ppd, 3/4 bottle of wine per night. No recreational drugs.  Fhx: non-contributory    Additional information obtained via outpatient Allscripts notes:  TTE 8/2017: normal LV size/function, no WMA.   Labs Hgb baseline 12.6  Cr baseline 1.21  Total chol 214

## 2018-01-06 NOTE — H&P ADULT - PROBLEM SELECTOR PLAN 5
History of HLD.  - Continue with atorvastatin 40mg qhs. History of HTN. Verified medications with pharmacy: dilt ER 300mg qd, amlodipine 2.5mg, olmesartan 40mg qd.  - Restart medications in AM. Hold for systolic <100. History of HTN. BP systolic ranging 160-190 in ED. Verified medications with pharmacy: dilt ER 300mg qd, amlodipine 2.5mg, olmesartan 40mg qd.  - Restarting diltiazem ER 300mg qd and amlodipine 2.5mg in AM.  - Consider starting losartan 100mg if patient remains HTN >180, as patient is on olmesartan 40mg at home (via therapeutic interchange). Patient with recent reported fall at home. Etiology likely mechanical in nature. Differential includes seizures as patient Less likely cardiogenic given recent normal TTE. Unlikely arrhythmogenic given EKG NSR.  - PT consulted.  - Check orthostatics in AM. Plan as above.  - Consider d/c bowles once patient's condition improves.

## 2018-01-06 NOTE — PROGRESS NOTE ADULT - PROBLEM SELECTOR PLAN 4
hx of seizure. pt with also hx of extensive etoh use.   [facility] [place]   neuro following

## 2018-01-06 NOTE — H&P ADULT - PROBLEM SELECTOR PLAN 2
Patient dry on admission exam. Elevated BUN and urine specific gravity, as supportive evidence. Given 1L NS bolus x2 in ED.  - Start on NS @ 100 cc/hr for hydration. Patient presented with AMS, AOx2 initially. UA positive.  - Starting on ceftriaxone 1g q24h.  - Follow up urine culture for speciation and sensitivity. Patient initially septic, meeting 2/4 SIRS (hypothermia, leukocytosis), LA 1.2, with AMS, AOx2 initially. UA positive.  - Starting on ceftriaxone 1g q24h.  - Follow up urine culture for speciation and sensitivity.  - Trend troponin and CK, likely demand ischemia in the setting of sepsis.

## 2018-01-06 NOTE — PHYSICAL THERAPY INITIAL EVALUATION ADULT - IMPAIRMENTS FOUND, PT EVAL
arousal, attention, and cognition/aerobic capacity/endurance/muscle strength/gait, locomotion, and balance

## 2018-01-06 NOTE — PHYSICAL THERAPY INITIAL EVALUATION ADULT - PREDICTED DURATION OF THERAPY (DAYS/WKS), PT EVAL
Pt. would benefit from further PT follow up to improve transfers, bed mobility, strength, balance, assess ambulation.

## 2018-01-06 NOTE — H&P ADULT - PROBLEM SELECTOR PLAN 1
Patient confused upon arrival to ED. AOx2 on arrival, AOx3 on admission exam -- slightly improved. Etiology likely toxic metabolic encephalopathy, but with possibly a component of dementia. UTI is possible etiology given history of possible prostate cancer and leukocytosis on admission, but cannot confirm as UA was never obtained.  As per ED, patient had not voided and bowles could not be placed due to possible obstruction. CT head negative. EtOH negative, Utox negative. Patient pan scanned, only remarkable for chronic L1 compression fracture.  - Urology consulted; bowles was placed, draining dark yellow urine.  - Follow up UA.  - TSH, RPR, and B12 in AM.  - Obtain collateral from Dr. Romero Alvarado (PMD) in AM. Patient confused upon arrival to ED. AOx2 on arrival, AOx3 on admission exam -- slightly improved. Etiology likely toxic metabolic encephalopathy, but with possibly a component of dementia. UTI is possible etiology given history of possible prostate cancer and leukocytosis on admission, but cannot confirm as UA was never obtained.  As per ED, patient had not voided and bowles could not be placed due to possible obstruction. CT head negative. EtOH negative, Utox negative. Patient pan scanned, only remarkable for chronic L1 compression fracture.  - Urology consulted; bowles was placed, draining dark yellow urine.  - Follow up UA.  - TSH, RPR, and B12 in AM.  - Banana bag, thiamine 500mg q8h, folate, and MVI given heavy EtOH use.  - Consider neurology consult given AMS and LE weakness.  - Obtain collateral from Dr. Romero Alvarado (PMD) in AM.

## 2018-01-06 NOTE — CONSULT NOTE ADULT - ATTENDING COMMENTS
Mental status appears improved compared to on admission. Unclear what baseline is. On exam he is coherent and oriented but somewhat inattentive and does not seem to remember much of the past few days; he also has significant leg weakness and patellar hyperreflexia    Recommend:   -obtain collateral about baseline cognitive / overall functioning  -video EEG given reported h/o seizures; hold keppra for now to improve sensitivity of EEG  -MRI T and L spine w/o contrast to look for cord and/or cauda equina compression that could explain leg weakness  -treatment of UTI per medical team  -will follow

## 2018-01-06 NOTE — H&P ADULT - ASSESSMENT
83 y/o M poor historian with PMHx of HTN, HLD, ?prostate cancer history, phimosis of the penis, presents to ED with AMS.

## 2018-01-06 NOTE — H&P ADULT - PROBLEM SELECTOR PLAN 4
History of HTN. Verified medications with pharmacy: dilt ER 300mg qd, amlodipine 2.5mg, olmesartan 40mg qd.  - Restart medications in AM. Hold for systolic <100. Patient with recent reported fall at home. Etiology likely mechanical in nature. Less likely cardiogenic given recent normal TTE. Unlikely arrhythmogenic given EKG NSR.  - PT consulted.  - Check orthostatics in AM. Patient with recent reported fall at home. Etiology likely mechanical in nature. Differential includes seizures as patient Less likely cardiogenic given recent normal TTE. Unlikely arrhythmogenic given EKG NSR.  - PT consulted.  - Check orthostatics in AM. Plan as above. Patient dry on admission exam. Elevated BUN and urine specific gravity, as supportive evidence. Given 1L NS bolus x2 in ED.  - Start on NS @ 100 cc/hr for hydration.

## 2018-01-06 NOTE — H&P ADULT - PROBLEM SELECTOR PLAN 9
VTE ppx: no indication for pharmacoprophylaxis at this time given low risk for VTE (IMPROVE score of 1).    Code: FULL CODE. F: NS @ 100 cc/hr for dehydration.  E: replete electrolytes prn.  N: regular diet.

## 2018-01-06 NOTE — CHART NOTE - NSCHARTNOTEFT_GEN_A_CORE
Spoke to patients wife (emergency contact on file). States they are not living together;  due to patients "alcohol problem". Wife states she last saw the patient on 12/25 when he showed up to her apartment "drunk and very wobbly". Wife states that the patient has had a "drinking problem" for the last several years. She urged him to seek help, however, the patient would always argue and fight with her to the point where he got his own apartment. Wife reports lower extremity weekness due to many falls in the past- now ambulates with a walker. Wife also states he had 1 seizure 5 years ago; unclear etiology- she wasn't sure if it was etoh related.   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   - gradual improvement in mental status since this morning, however, still only oriented to person and time only  - Neurology following. Will continue to monitor

## 2018-01-06 NOTE — H&P ADULT - PROBLEM SELECTOR PLAN 3
Patient with recent reported fall at home. Etiology likely mechanical in nature. Less likely cardiogenic given recent normal TTE. Unlikely arrhythmogenic given EKG NSR.  - PT consulted.  - Check orthostatics in AM. Plan as above. Patient dry on admission exam. Elevated BUN and urine specific gravity, as supportive evidence. Given 1L NS bolus x2 in ED.  - Start on NS @ 100 cc/hr for hydration.

## 2018-01-06 NOTE — H&P ADULT - PROBLEM SELECTOR PLAN 7
F: NS @ 100 cc/hr for dehydration.  E: replete electrolytes prn.  N: regular diet. History of HLD.  - Continue with atorvastatin 40mg qhs. History of HTN. BP systolic ranging 160-190 in ED. Verified medications with pharmacy: dilt ER 300mg qd, amlodipine 2.5mg, olmesartan 40mg qd.  - Restarting diltiazem ER 300mg qd and amlodipine 2.5mg in AM.  - Consider starting losartan 100mg if patient remains HTN >180, as patient is on olmesartan 40mg at home (via therapeutic interchange).

## 2018-01-06 NOTE — H&P ADULT - ATTENDING COMMENTS
pt seen and examined by me. case d/w housestaff, agree with VS, PE, assessment and plan as outlined above.

## 2018-01-06 NOTE — PROGRESS NOTE ADULT - SUBJECTIVE AND OBJECTIVE BOX
Called to assist with bowles placement due to phimosis.  Patient is admitted to medicine and bowles needed for I/O.  Using sterile technique, patient prepped and draped.  16F unable to pass due to tight foreskin, used a 12F and was successfully placed.  Foreskin replaced in correct position.  Dark yellow urine draining to bag.  10cc in balloon.  Patient tolerated well.  Trial of void per primary team.

## 2018-01-06 NOTE — CONSULT NOTE ADULT - SUBJECTIVE AND OBJECTIVE BOX
HPI: 85 yo M BIBEMS with AMS. History obtained by patient and is of unclear reliability. Patient reports that he fell two weeks ago and had blood in his urine which prompted admission to [facility] from which he was discharged a few days ago. When asked about what brought him into the hospital he repeated the story of how he fell two weeks ago. He currently denies any symptoms besides weakness in his legs which has interfered with his daily activities. He denied headaches, chest pain, stomach ache, and bowl issues. He endorsed parathesias, but noted that they have been present for around two years. He denies any past medical history except "prostate problems" and denies a seizure history (although Keppra was prescribed as per pharmacy, and last dispensed in October 2017).     Per ED history: Was found in his apartment on the ground incontinent of stool and confused.      Exam:  Mental status: Patient was alert to person, time (January, 2018, said January 5th), but not place (said home, then [facility]). Could name Pen, [place] cup, Television. Simple repetition was intact (Apple, table, christiano). Memory was not assessed. Serial 7's were largely inaccurate, patient seemed to struggle remembering what number he was on. WORLD spelled correctly forward, but not backwards.      Cranial nerves: II -> pupils were small and thus reactivity was difficult to assess. Peripheral vision intact. III- horizontal eye movements intact, mild vertical gave palsy. No conversion-retraction nystagmus or light/near dissociation. IV & VI -> intact. V-> normal sensation bilaterally. VII -> smile equal, no facial weakness observed. VIII-> equal bilaterally. IX & X-> palatal elevation normal. XII- tongue midline.      Coordination: No dysmetria as assessed on finger - to - nose testing. Gait not assessed.      Motor: Mild pronator drift left hand and arm. Rapid finger testing could not be assessed as patient could not comprehend the request.  UE strength -> with encouragement full strength symmetric. LE strength -> 2/5 hip flexors, 3/5 knee extensors, 4/5 TA symmetric      Reflexes: 2+ BL UEs. 3+ Bilateral patellar, absent Achilles. Babinski was difficult to assess.      Sensory: Intact all four extremities as per patient. Vibration sensation was slightly more sensitive on the right foot, but equal bilaterally in the region of the hip, just superior to the knee. JPS intact b/l toes. Possible mid-to-lower thoracic sensory level, higher on the right than the left    Gait: deferred due to weakness     A: This is an 84 year old man who was BIBEMS to [facility] after he was found laying on the floor of his apartment, incontinent of feces and was altered mentally. His exam was significant for AMS possibly 2/2 to delirium from UTI vs. dementia vs. head trauma (although head CT did not show any acute hemorrhage, traumatic lesion, or ischemia). His exam also showed significant LE weakness but the patient normally uses a walker at home and this may be baseline. CT C/A/P demonstrated extensive degenerative changes in the spine with spinal canal narrowing in the mid-to-lower thoracic and lower lumbar regions.     P:   - An MRI of thoracic and lumbar spine is recommended to further assess degenerative disease and look for spinal cord and/or cauda equina nerve root compression.   - AMS most likely delirium, possibly from UTI. Treatment per primary team  - Collateral information from relatives and wife regarding baseline cognitive and overall functional status  - will follow HPI: 85 yo M BIBEMS with AMS. History obtained by patient and is of unclear reliability. Patient reports that he fell two weeks ago and had blood in his urine which prompted admission to [facility] from which he was discharged a few days ago. When asked about what brought him into the hospital he repeated the story of how he fell two weeks ago. He currently denies any symptoms besides weakness in his legs which has interfered with his daily activities. He denied headaches, chest pain, stomach ache, and bowl issues. He endorsed parathesias, but noted that they have been present for around two years. He denies any past medical history except "prostate problems" and denies a seizure history (although Keppra was prescribed as per pharmacy, and last dispensed in October 2017).     Per ED history: Was found in his apartment on the ground incontinent of stool and confused.      Exam:  Mental status: Patient was alert to person, time (January, 2018, said January 5th), but not place (said home, then [facility]). Could name Pen, [place] cup, Television. Simple repetition was intact (Apple, table, christiano). Memory was not assessed. Serial 7's were largely inaccurate, patient seemed to struggle remembering what number he was on. WORLD spelled correctly forward, but not backwards.   Cranial nerves: II -> pupils were small and thus reactivity was difficult to assess. Peripheral vision intact. III- horizontal eye movements intact, mild vertical gave palsy. No conversion-retraction nystagmus or light/near dissociation. IV & VI -> intact. V-> normal sensation bilaterally. VII -> smile equal, no facial weakness observed. VIII-> equal bilaterally. IX & X-> palatal elevation normal. XII- tongue midline.   Coordination: No dysmetria as assessed on finger - to - nose testing. Gait not assessed.   Motor: Mild pronator drift left hand and arm. Rapid finger testing could not be assessed as patient could not comprehend the request.  UE strength -> with encouragement full strength symmetric. LE strength -> 2/5 hip flexors, 3/5 knee extensors, 4/5 TA symmetric   Reflexes: 2+ BL UEs. 3+ Bilateral patellar, absent Achilles. Babinski was difficult to assess.   Sensory: Intact all four extremities as per patient. Vibration sensation was slightly more sensitive on the right foot, but equal bilaterally in the region of the hip, just superior to the knee. JPS intact b/l toes. Possible mid-to-lower thoracic sensory level, higher on the right than the left  Gait: deferred due to weakness     A: This is an 84 year old man who was BIBEMS to [facility] after he was found laying on the floor of his apartment, incontinent of feces and was altered mentally. His exam was significant for AMS possibly 2/2 to delirium from UTI vs. dementia vs. head trauma (although head CT did not show any acute hemorrhage, traumatic lesion, or ischemia) vs. seizure (unclear history of that in the past, supposed to be on keppra). His exam also showed significant LE weakness but the patient normally uses a walker at home and this may be baseline. CT C/A/P demonstrated extensive degenerative changes in the spine with spinal canal narrowing in the mid-to-lower thoracic and lower lumbar regions.     P:   - An MRI of thoracic and lumbar spine is recommended to further assess degenerative disease and look for spinal cord and/or cauda equina nerve root compression.   - AMS most likely delirium, possibly from UTI. Treatment per primary team  - Video EEG   - Can hold keppra for now  - Collateral information from relatives and wife regarding baseline cognitive and overall functional status  - will follow

## 2018-01-06 NOTE — PROGRESS NOTE ADULT - PROBLEM SELECTOR PLAN 1
etiology unclear. possible infectious 2/2 UTI- will monitor for improvement with treatment with ceftriaxone.

## 2018-01-06 NOTE — H&P ADULT - PROBLEM SELECTOR PLAN 8
VTE ppx: no indication for pharmacoprophylaxis at this time given low risk for VTE (IMPROVE score of 1).    Code: FULL CODE. F: NS @ 100 cc/hr for dehydration.  E: replete electrolytes prn.  N: regular diet. History of HLD.  - Continue with atorvastatin 40mg qhs.

## 2018-01-06 NOTE — H&P ADULT - NSHPLABSRESULTS_GEN_ALL_CORE
.  LABS:                         11.6   12.1  )-----------( 351      ( 2018 22:24 )             35.8         145  |  103  |  25<H>  ----------------------------<  102<H>  3.7   |  24  |  1.13    Ca    9.7      2018 22:24    TPro  7.7  /  Alb  3.6  /  TBili  0.6  /  DBili  x   /  AST  27  /  ALT  9<L>  /  AlkPhos  119      Urinalysis Basic - ( 2018 01:44 )  Color: Yellow / Appearance: SL Cloudy / S.020 / pH: x  Gluc: x / Ketone: Trace mg/dL  / Bili: Small / Urobili: 0.2 E.U./dL   Blood: x / Protein: 30 mg/dL / Nitrite: NEGATIVE   Leuk Esterase: Trace / RBC: 5-10 /HPF / WBC Many /HPF   Sq Epi: x / Non Sq Epi: 5-10 /HPF / Bacteria: Many /HPF    CARDIAC MARKERS ( 2018 22:24 )  x     / 0.02 ng/mL / 485 U/L / x     / 4.5 ng/mL    Lactate, Blood: 1.2 mmoL/L ( @ 22:23)    RADIOLOGY, EKG & ADDITIONAL TESTS: Reviewed. .  LABS:                         11.6   12.1  )-----------( 351      ( 2018 22:24 )             35.8         145  |  103  |  25<H>  ----------------------------<  102<H>  3.7   |  24  |  1.13    Ca    9.7      2018 22:24    TPro  7.7  /  Alb  3.6  /  TBili  0.6  /  DBili  x   /  AST  27  /  ALT  9<L>  /  AlkPhos  119      Urinalysis Basic - ( 2018 01:44 )  Color: Yellow / Appearance: SL Cloudy / S.020 / pH: x  Gluc: x / Ketone: Trace mg/dL  / Bili: Small / Urobili: 0.2 E.U./dL   Blood: x / Protein: 30 mg/dL / Nitrite: NEGATIVE   Leuk Esterase: Trace / RBC: 5-10 /HPF / WBC Many /HPF   Sq Epi: x / Non Sq Epi: 5-10 /HPF / Bacteria: Many /HPF    CARDIAC MARKERS ( 2018 22:24 )  x     / 0.02 ng/mL / 485 U/L / x     / 4.5 ng/mL    Lactate, Blood: 1.2 mmoL/L ( @ 22:23)    RADIOLOGY, EKG & ADDITIONAL TESTS:

## 2018-01-06 NOTE — H&P ADULT - PROBLEM SELECTOR PLAN 10
VTE ppx: no indication for pharmacoprophylaxis at this time given low risk for VTE (IMPROVE score of 1).    Code: FULL CODE.

## 2018-01-06 NOTE — PROGRESS NOTE ADULT - SUBJECTIVE AND OBJECTIVE BOX
pt is alert, obeys some commands, does not answer most questions appropriately.  no acute complaints. does endorse LE weakness and pain.     [  X] ROS otherwise negative      Vital SignsVital Signs Last 24 Hrs  T(C): 37.1 (06 Jan 2018 09:08), Max: 37.1 (06 Jan 2018 09:08)  T(F): 98.7 (06 Jan 2018 09:08), Max: 98.7 (06 Jan 2018 09:08)  HR: 72 (06 Jan 2018 09:08) (72 - 81)  BP: 154/68 (06 Jan 2018 09:08) (135/79 - 190/76)  BP(mean): --  RR: 16 (06 Jan 2018 09:08) (16 - 18)  SpO2: 98% (06 Jan 2018 09:08) (96% - 99%)    I&O's Detail    05 Jan 2018 07:01  -  06 Jan 2018 07:00  --------------------------------------------------------  IN:  Total IN: 0 mL    OUT:    Voided: 300 mL  Total OUT: 300 mL    Total NET: -300 mL          General: [X  ]Well Appearing   [  ]Ill Appearing [  ]Lethargic [  ]Agitated [  ]Pale [  ]Cachectic  HEENT: [X  ]Nonicteric [  ]SAFIA [  ]Other:  CV: [ X ] RRR   [  ]Irregularly Irregular  [  ] No JVD [  ]Murmur:                      [  ]Other:  Lungs: [  X]CTAB  [  ]Other:  ABD: [X ]Soft  [X  ]+Bowel Sounds  [X  ] Nontender  [X  ]NonDistended  [  ] No HSM   [  ] Other  Ext: [X  ]2+Pulses no edema  Skin: [X  ] Warm and Dry  [  ]Poor Skin Turgor  [  ]Rash:  : [ X ]Abbott  [  ]Other:  Neuro: alert, oriented to place, person.

## 2018-01-06 NOTE — H&P ADULT - PROBLEM SELECTOR PROBLEM 7
Need for prophylactic measure Nutrition, metabolism, and development symptoms HLD (hyperlipidemia) HTN (hypertension)

## 2018-01-07 DIAGNOSIS — R29.898 OTHER SYMPTOMS AND SIGNS INVOLVING THE MUSCULOSKELETAL SYSTEM: ICD-10-CM

## 2018-01-07 DIAGNOSIS — E53.8 DEFICIENCY OF OTHER SPECIFIED B GROUP VITAMINS: ICD-10-CM

## 2018-01-07 LAB
ANION GAP SERPL CALC-SCNC: 13 MMOL/L — SIGNIFICANT CHANGE UP (ref 5–17)
BUN SERPL-MCNC: 19 MG/DL — SIGNIFICANT CHANGE UP (ref 7–23)
CALCIUM SERPL-MCNC: 8.6 MG/DL — SIGNIFICANT CHANGE UP (ref 8.4–10.5)
CHLORIDE SERPL-SCNC: 107 MMOL/L — SIGNIFICANT CHANGE UP (ref 96–108)
CO2 SERPL-SCNC: 21 MMOL/L — LOW (ref 22–31)
CREAT SERPL-MCNC: 1.02 MG/DL — SIGNIFICANT CHANGE UP (ref 0.5–1.3)
GLUCOSE SERPL-MCNC: 91 MG/DL — SIGNIFICANT CHANGE UP (ref 70–99)
HCT VFR BLD CALC: 32.8 % — LOW (ref 39–50)
HGB BLD-MCNC: 10.3 G/DL — LOW (ref 13–17)
MAGNESIUM SERPL-MCNC: 2 MG/DL — SIGNIFICANT CHANGE UP (ref 1.6–2.6)
MCHC RBC-ENTMCNC: 28.4 PG — SIGNIFICANT CHANGE UP (ref 27–34)
MCHC RBC-ENTMCNC: 31.4 G/DL — LOW (ref 32–36)
MCV RBC AUTO: 90.4 FL — SIGNIFICANT CHANGE UP (ref 80–100)
PLATELET # BLD AUTO: 290 K/UL — SIGNIFICANT CHANGE UP (ref 150–400)
POTASSIUM SERPL-MCNC: 4.9 MMOL/L — SIGNIFICANT CHANGE UP (ref 3.5–5.3)
POTASSIUM SERPL-SCNC: 4.9 MMOL/L — SIGNIFICANT CHANGE UP (ref 3.5–5.3)
RBC # BLD: 3.63 M/UL — LOW (ref 4.2–5.8)
RBC # FLD: 15.1 % — SIGNIFICANT CHANGE UP (ref 10.3–16.9)
SODIUM SERPL-SCNC: 141 MMOL/L — SIGNIFICANT CHANGE UP (ref 135–145)
T PALLIDUM AB TITR SER: NEGATIVE — SIGNIFICANT CHANGE UP
WBC # BLD: 10.6 K/UL — HIGH (ref 3.8–10.5)
WBC # FLD AUTO: 10.6 K/UL — HIGH (ref 3.8–10.5)

## 2018-01-07 PROCEDURE — 99233 SBSQ HOSP IP/OBS HIGH 50: CPT

## 2018-01-07 PROCEDURE — 99233 SBSQ HOSP IP/OBS HIGH 50: CPT | Mod: GC

## 2018-01-07 RX ORDER — PREGABALIN 225 MG/1
1000 CAPSULE ORAL DAILY
Qty: 0 | Refills: 0 | Status: DISCONTINUED | OUTPATIENT
Start: 2018-01-07 | End: 2018-01-10

## 2018-01-07 RX ADMIN — Medication 40 MILLIEQUIVALENT(S): at 05:26

## 2018-01-07 RX ADMIN — ATORVASTATIN CALCIUM 40 MILLIGRAM(S): 80 TABLET, FILM COATED ORAL at 22:44

## 2018-01-07 RX ADMIN — AMLODIPINE BESYLATE 2.5 MILLIGRAM(S): 2.5 TABLET ORAL at 05:26

## 2018-01-07 RX ADMIN — Medication 1 MILLIGRAM(S): at 11:44

## 2018-01-07 RX ADMIN — Medication 105 MILLIGRAM(S): at 14:16

## 2018-01-07 RX ADMIN — Medication 105 MILLIGRAM(S): at 22:45

## 2018-01-07 RX ADMIN — Medication 1 TABLET(S): at 11:44

## 2018-01-07 RX ADMIN — Medication 100 MILLIGRAM(S): at 11:44

## 2018-01-07 RX ADMIN — Medication 40 MILLIEQUIVALENT(S): at 01:30

## 2018-01-07 RX ADMIN — HEPARIN SODIUM 5000 UNIT(S): 5000 INJECTION INTRAVENOUS; SUBCUTANEOUS at 05:26

## 2018-01-07 RX ADMIN — HEPARIN SODIUM 5000 UNIT(S): 5000 INJECTION INTRAVENOUS; SUBCUTANEOUS at 14:17

## 2018-01-07 RX ADMIN — Medication 300 MILLIGRAM(S): at 06:31

## 2018-01-07 RX ADMIN — PREGABALIN 1000 MICROGRAM(S): 225 CAPSULE ORAL at 18:02

## 2018-01-07 RX ADMIN — Medication 105 MILLIGRAM(S): at 05:27

## 2018-01-07 RX ADMIN — CEFTRIAXONE 100 GRAM(S): 500 INJECTION, POWDER, FOR SOLUTION INTRAMUSCULAR; INTRAVENOUS at 06:31

## 2018-01-07 RX ADMIN — HEPARIN SODIUM 5000 UNIT(S): 5000 INJECTION INTRAVENOUS; SUBCUTANEOUS at 22:45

## 2018-01-07 NOTE — PROGRESS NOTE ADULT - PROBLEM SELECTOR PLAN 2
Patient initially septic, meeting 2/4 SIRS (hypothermia, leukocytosis), LA 1.2, with AMS, AOx2 initially. UA positive.  - Starting on ceftriaxone 1g q24h.  - Follow up urine culture for speciation and sensitivity.  - Trend troponin and CK, likely demand ischemia in the setting of sepsis. Resolved. Patient initially septic, meeting 2/4 SIRS (hypothermia, leukocytosis), LA 1.2, with AMS, AOx2 initially. UA positive.  - Starting on ceftriaxone 1g q24h.  - Follow up urine culture for speciation and sensitivity.  - Trend troponin and CK, likely demand ischemia in the setting of sepsis.

## 2018-01-07 NOTE — PROGRESS NOTE ADULT - ATTENDING COMMENTS
pt seen and examined by me. case d/w housestaff. agree with VS, PE, assessment and plan as above with following additives    Pt's wife at bedside. reports patient's mental status improved from yesterday, still not at baseline. pt does not recall events leading upto hospitalization.    plan  1- Acute encephelopathy- possibly infectious- fu urine cultures- c/w abx  2- Complicated UTI- c/w abx; remove bowles- trial of void  3- alcohol abuse/dependence- no s/s withdrawal. monitor CIWA  4- LE weakness- possible spinal stenosis- fu MRI thoracic/lumbar spine  5- hx seizure- will d/w neurology regarding restarting antiepleptic

## 2018-01-07 NOTE — PROGRESS NOTE ADULT - PROBLEM SELECTOR PLAN 4
MRI performed. Per Neuro partially due to severe multilevel lumbar degenerative disease.   -Consider spine consult, although patient may not be interested in surgery. Discuss tomorrow  -F/u Neuro recs  -PT

## 2018-01-07 NOTE — PROGRESS NOTE ADULT - PROBLEM SELECTOR PLAN 10
VTE ppx: no indication for pharmacoprophylaxis at this time given low risk for VTE (IMPROVE score of 1).  Dispo: 4 Uris  Code: FULL CODE.

## 2018-01-07 NOTE — PROGRESS NOTE ADULT - PROBLEM SELECTOR PLAN 5
Low at 242. Neuro recs starting B12 supplementation as per below  -Start 1000mcg IM daily x 5 days (day 1 is 1/7) then weekly x 4, then 1000mcg PO daily.  -Switch to weekly B12 on 1/12  -F/u MMA, Folate, Homocysteine, Vit-D 25  -F/u Neuro recs

## 2018-01-07 NOTE — PROGRESS NOTE ADULT - PROBLEM SELECTOR PLAN 7
HTN controlled today. Holding off on Losartan for now. Verified medications with pharmacy: dilt ER 300mg qd, amlodipine 2.5mg, olmesartan 40mg qd.  - Restarted diltiazem ER 300mg qd and amlodipine 2.5mg today  - Restart losartan 100mg HTN >180, patient is on olmesartan 40mg at home (via therapeutic interchange).

## 2018-01-07 NOTE — PROGRESS NOTE ADULT - PROBLEM SELECTOR PLAN 6
Patient with recent reported fall at home. Etiology likely mechanical in nature. Differential includes seizures as patient Less likely cardiogenic given recent normal TTE. Unlikely arrhythmogenic given EKG NSR.  - PT consulted.  - Check orthostatics in AM.

## 2018-01-07 NOTE — PROGRESS NOTE ADULT - SUBJECTIVE AND OBJECTIVE BOX
SUBJECTIVE: Patient seen and examined at bedside. Asks about when he can get out of bed to walk around. Complains of some mild neck pain but otherwise negative ROS; no dysuria    ROS:  CV: Denies chest pain  Resp: Denies SOB  GI: Denies abdominal pain, diarrhea, nausea, vomiting  ID: Denies fevers, chills    OBJECTIVE:    VITALS  Vital Signs Last 24 Hrs  T(C): 37 (2018 08:35), Max: 37.1 (2018 04:52)  T(F): 98.6 (2018 08:35), Max: 98.7 (2018 04:52)  HR: 67 (2018 08:35) (63 - 89)  BP: 156/80 (2018 08:35) (132/71 - 156/80)  BP(mean): --  RR: 16 (2018 08:35) (16 - 18)  SpO2: 98% (2018 08:35) (96% - 98%)    I&O's Summary    2018 07:01  -  2018 07:00  --------------------------------------------------------  IN: 1450 mL / OUT: 1200 mL / NET: 250 mL    PHYSICAL EXAM  General: elderly man in NAD resting in bed awake  Eyes: EOM grossly intact; no scleral icterus  ENMT: MMM, no pharyngeal erythema/exudate  Neck: Supple; no LAD  Respiratory: CTAB; no W/R/R auscultated; good air movement  Cardiovascular: RRR; S1/S2  Gastrointestinal: Soft; NTND without guarding; bowel sounds present  Extremities: WWP; no edema; radial/pedal pulses palpable  Neurological: AOx3; CNII-XII grossly intact; LEs 2/5 motor b/L w/ L slightly < R; UEs 5/5 b/L; SILT    MEDICATIONS  (STANDING):  allopurinol 100 milliGRAM(s) Oral daily  amLODIPine   Tablet 2.5 milliGRAM(s) Oral daily  atorvastatin 40 milliGRAM(s) Oral at bedtime  cefTRIAXone   IVPB 1 Gram(s) IV Intermittent every 24 hours  diltiazem    milliGRAM(s) Oral daily  folic acid 1 milliGRAM(s) Oral daily  heparin  Injectable 5000 Unit(s) SubCutaneous every 8 hours  multivitamin 1 Tablet(s) Oral daily  multivitamin/thiamine/folic acid in sodium chloride 0.9% 1000 milliLiter(s) (100 mL/Hr) IV Continuous <Continuous>  thiamine IVPB 500 milliGRAM(s) IV Intermittent every 8 hours    LABS                        10.3   10.6  )-----------( 290      ( 2018 08:44 )             32.8         141  |  107  |  19  ----------------------------<  91  4.9   |  21<L>  |  1.02    Ca    8.6      2018 08:44  Mg     2.0         TPro  7.7  /  Alb  3.6  /  TBili  0.6  /  DBili  x   /  AST  27  /  ALT  9<L>  /  AlkPhos  119      LIVER FUNCTIONS - ( 2018 22:24 )  Alb: 3.6 g/dL / Pro: 7.7 g/dL / ALK PHOS: 119 U/L / ALT: 9 U/L / AST: 27 U/L / GGT: x           Urinalysis Basic - ( 2018 01:44 )    Color: Yellow / Appearance: SL Cloudy / S.020 / pH: x  Gluc: x / Ketone: Trace mg/dL  / Bili: Small / Urobili: 0.2 E.U./dL   Blood: x / Protein: 30 mg/dL / Nitrite: NEGATIVE   Leuk Esterase: Trace / RBC: 5-10 /HPF / WBC Many /HPF   Sq Epi: x / Non Sq Epi: 5-10 /HPF / Bacteria: Many /HPF      CARDIAC MARKERS ( 2018 15:47 )  x     / 0.02 ng/mL / x     / x     / x      CARDIAC MARKERS ( 2018 08:58 )  x     / 0.02 ng/mL / x     / x     / x      CARDIAC MARKERS ( 2018 22:24 )  x     / 0.02 ng/mL / 485 U/L / x     / 4.5 ng/mL      Urinalysis Basic - ( 2018 01:44 )    Color: Yellow / Appearance: SL Cloudy / S.020 / pH: x  Gluc: x / Ketone: Trace mg/dL  / Bili: Small / Urobili: 0.2 E.U./dL   Blood: x / Protein: 30 mg/dL / Nitrite: NEGATIVE   Leuk Esterase: Trace / RBC: 5-10 /HPF / WBC Many /HPF   Sq Epi: x / Non Sq Epi: 5-10 /HPF / Bacteria: Many /HPF    ALLERGIES:  No Known Allergies    RADIOLOGY and Additional Tests reviewed.

## 2018-01-07 NOTE — PROGRESS NOTE ADULT - PROBLEM SELECTOR PLAN 1
Patient confused upon arrival to ED. AOx2 on arrival, AOx3 on admission exam -- slightly improved. Continued AOx3 this AM. Etiology likely toxic metabolic encephalopathy, but with possibly a component of dementia. UTI possible etiology given history of ?prostate cancer and leukocytosis on admission, but cannot confirm as UA was never obtained.  As per ED, patient had not voided and bowles could not be placed due to possible obstruction. CT head negative. EtOH negative, Utox negative. Patient pan scanned, only remarkable for chronic L1 compression fracture. TSH WNL, Syph negative.  - Urology consulted; bowles was placed, draining dark yellow urine.  - Banana bag, thiamine 500mg q8h, folate, and MVI given heavy EtOH use.  - F/u Neuro recs  - Obtain collateral from Dr. Romero Alvarado (PMD) on Monday Patient confused upon arrival to ED. AOx2 on arrival, AOx3 on admission exam -- slightly improved. Continued AOx3 this AM. Etiology likely toxic metabolic encephalopathy, but with possibly a component of dementia. UTI possible etiology given history of ?prostate cancer and leukocytosis on admission, but cannot confirm as UA was never obtained.  As per ED, patient had not voided and bowles could not be placed due to possible obstruction. CT head negative. EtOH negative, Utox negative. Patient pan scanned, only remarkable for chronic L1 compression fracture. TSH WNL, Syph negative.  - Urology consulted; bowles was placed, draining dark yellow urine.  - F/u Neuro recs  - Obtain collateral from Dr. Romero Alvarado (PMD) on Monday  - Banana bag, thiamine 500mg q8h, folate, and MVI given heavy EtOH use.  - CIWA 0 this AM

## 2018-01-08 DIAGNOSIS — N17.9 ACUTE KIDNEY FAILURE, UNSPECIFIED: ICD-10-CM

## 2018-01-08 DIAGNOSIS — I16.9 HYPERTENSIVE CRISIS, UNSPECIFIED: ICD-10-CM

## 2018-01-08 LAB
-  AMPICILLIN: SIGNIFICANT CHANGE UP
-  CIPROFLOXACIN: SIGNIFICANT CHANGE UP
-  NITROFURANTOIN: SIGNIFICANT CHANGE UP
-  TETRACYCLINE: SIGNIFICANT CHANGE UP
-  VANCOMYCIN: SIGNIFICANT CHANGE UP
24R-OH-CALCIDIOL SERPL-MCNC: 38.8 NG/ML — SIGNIFICANT CHANGE UP (ref 30–80)
ANION GAP SERPL CALC-SCNC: 18 MMOL/L — HIGH (ref 5–17)
BUN SERPL-MCNC: 13 MG/DL — SIGNIFICANT CHANGE UP (ref 7–23)
CALCIUM SERPL-MCNC: 9.4 MG/DL — SIGNIFICANT CHANGE UP (ref 8.4–10.5)
CHLORIDE SERPL-SCNC: 102 MMOL/L — SIGNIFICANT CHANGE UP (ref 96–108)
CO2 SERPL-SCNC: 21 MMOL/L — LOW (ref 22–31)
CREAT SERPL-MCNC: 1.03 MG/DL — SIGNIFICANT CHANGE UP (ref 0.5–1.3)
CULTURE RESULTS: SIGNIFICANT CHANGE UP
FOLATE SERPL-MCNC: 4.4 NG/ML — LOW (ref 4.8–24.2)
GLUCOSE SERPL-MCNC: 116 MG/DL — HIGH (ref 70–99)
HCT VFR BLD CALC: 35.3 % — LOW (ref 39–50)
HGB BLD-MCNC: 11.2 G/DL — LOW (ref 13–17)
MAGNESIUM SERPL-MCNC: 2 MG/DL — SIGNIFICANT CHANGE UP (ref 1.6–2.6)
MCHC RBC-ENTMCNC: 28.5 PG — SIGNIFICANT CHANGE UP (ref 27–34)
MCHC RBC-ENTMCNC: 31.7 G/DL — LOW (ref 32–36)
MCV RBC AUTO: 89.8 FL — SIGNIFICANT CHANGE UP (ref 80–100)
METHOD TYPE: SIGNIFICANT CHANGE UP
ORGANISM # SPEC MICROSCOPIC CNT: SIGNIFICANT CHANGE UP
ORGANISM # SPEC MICROSCOPIC CNT: SIGNIFICANT CHANGE UP
PLATELET # BLD AUTO: 350 K/UL — SIGNIFICANT CHANGE UP (ref 150–400)
POTASSIUM SERPL-MCNC: 4.4 MMOL/L — SIGNIFICANT CHANGE UP (ref 3.5–5.3)
POTASSIUM SERPL-SCNC: 4.4 MMOL/L — SIGNIFICANT CHANGE UP (ref 3.5–5.3)
RBC # BLD: 3.93 M/UL — LOW (ref 4.2–5.8)
RBC # FLD: 14.9 % — SIGNIFICANT CHANGE UP (ref 10.3–16.9)
SODIUM SERPL-SCNC: 141 MMOL/L — SIGNIFICANT CHANGE UP (ref 135–145)
SPECIMEN SOURCE: SIGNIFICANT CHANGE UP
WBC # BLD: 12.2 K/UL — HIGH (ref 3.8–10.5)
WBC # FLD AUTO: 12.2 K/UL — HIGH (ref 3.8–10.5)

## 2018-01-08 PROCEDURE — 99233 SBSQ HOSP IP/OBS HIGH 50: CPT | Mod: GC

## 2018-01-08 PROCEDURE — 99233 SBSQ HOSP IP/OBS HIGH 50: CPT

## 2018-01-08 RX ORDER — AMOXICILLIN 250 MG/5ML
500 SUSPENSION, RECONSTITUTED, ORAL (ML) ORAL EVERY 12 HOURS
Qty: 0 | Refills: 0 | Status: DISCONTINUED | OUTPATIENT
Start: 2018-01-09 | End: 2018-01-09

## 2018-01-08 RX ADMIN — HEPARIN SODIUM 5000 UNIT(S): 5000 INJECTION INTRAVENOUS; SUBCUTANEOUS at 06:38

## 2018-01-08 RX ADMIN — Medication 105 MILLIGRAM(S): at 06:38

## 2018-01-08 RX ADMIN — CEFTRIAXONE 100 GRAM(S): 500 INJECTION, POWDER, FOR SOLUTION INTRAMUSCULAR; INTRAVENOUS at 06:38

## 2018-01-08 RX ADMIN — HEPARIN SODIUM 5000 UNIT(S): 5000 INJECTION INTRAVENOUS; SUBCUTANEOUS at 22:32

## 2018-01-08 RX ADMIN — Medication 1 TABLET(S): at 11:44

## 2018-01-08 RX ADMIN — Medication 300 MILLIGRAM(S): at 06:38

## 2018-01-08 RX ADMIN — ATORVASTATIN CALCIUM 40 MILLIGRAM(S): 80 TABLET, FILM COATED ORAL at 22:32

## 2018-01-08 RX ADMIN — Medication 105 MILLIGRAM(S): at 22:32

## 2018-01-08 RX ADMIN — Medication 1 MILLIGRAM(S): at 11:44

## 2018-01-08 RX ADMIN — Medication 25 MILLIGRAM(S): at 22:32

## 2018-01-08 RX ADMIN — Medication 105 MILLIGRAM(S): at 14:44

## 2018-01-08 RX ADMIN — AMLODIPINE BESYLATE 2.5 MILLIGRAM(S): 2.5 TABLET ORAL at 06:38

## 2018-01-08 RX ADMIN — Medication 25 MILLIGRAM(S): at 14:06

## 2018-01-08 RX ADMIN — HEPARIN SODIUM 5000 UNIT(S): 5000 INJECTION INTRAVENOUS; SUBCUTANEOUS at 14:06

## 2018-01-08 RX ADMIN — PREGABALIN 1000 MICROGRAM(S): 225 CAPSULE ORAL at 11:44

## 2018-01-08 RX ADMIN — Medication 100 MILLIGRAM(S): at 11:44

## 2018-01-08 NOTE — PROGRESS NOTE ADULT - PROBLEM SELECTOR PLAN 6
HTN controlled today. Holding off on Losartan for now. Verified medications with pharmacy: dilt ER 300mg qd, amlodipine 2.5mg, olmesartan 40mg qd.  - Restarted diltiazem ER 300mg qd and amlodipine 2.5mg today  - Restart losartan 100mg HTN >180, patient is on olmesartan 40mg at home (via therapeutic interchange). MRI performed. Per Neuro partially due to severe multilevel lumbar degenerative disease.   -Spine consult, although patient may not be interested in surgery  -F/u Neuro recs  -PT

## 2018-01-08 NOTE — PROGRESS NOTE ADULT - PROBLEM SELECTOR PLAN 5
Patient with recent reported fall at home. Likely in setting of EtOH based on history. Unlikely arrhythmogenic given EKG NSR.  - PT consulted bp better controlled

## 2018-01-08 NOTE — CONSULT NOTE ADULT - SUBJECTIVE AND OBJECTIVE BOX
HISTORY OF PRESENT ILLNESS: 83 yo M with PMHx of alcoholism, progressive dementia (as per wife), HTN, HLD was admitted for UTI and AMS. Neurosurgery was consulted for LE weakness. Patient was found laying on the floor of his apartment on Friday, january, 5. He had several episodes of fall for past few weeks as per family. Patient has no history of CAD or cardiac arrhythmia. Patient lives alone at home and ambulates slowly with cane at baseline. Patient has occasional incontinence since surgery for polyp removal vs. prostate ca? Patient is currently on Antibiotics for UTI. MRI of lumbar and thoracic spine shows multilevel degenerative disk disease, left sided/central disk portrusion of T5-T6 level and effacement of ventral thecal sac, cord impingement and diffuse disc bulges T10- T17 T 11-12 T 12-L1.      PAST MEDICAL & SURGICAL HISTORY:  HLD (hyperlipidemia)  HTN (hypertension)  No significant past surgical history    FAMILY HISTORY:  No pertinent family history in first degree relatives    SOCIAL HISTORY:  Tobacco Use:  EtOH use:   Substance:    Allergies    No Known Allergies    Intolerances      REVIEW OF SYSTEMS  General:	No acute distress.   Ophthalmologic: L visual changes/blurryness  Respiratory and Thorax: no breathing difficulties, cough  Cardiovascular:	no edema, sob  Gastrointestinal:	  Genitourinary:	UTI   Musculoskeletal:	 no pain, + weakness,   Neurological: progressive dementia as per family, unknown LOC    MEDICATIONS:  Antibiotics:    Neuro:  chlordiazePOXIDE 25 milliGRAM(s) Oral every 8 hours    Anticoagulation:  heparin  Injectable 5000 Unit(s) SubCutaneous every 8 hours    OTHER:  allopurinol 100 milliGRAM(s) Oral daily  amLODIPine   Tablet 2.5 milliGRAM(s) Oral daily  atorvastatin 40 milliGRAM(s) Oral at bedtime  diltiazem    milliGRAM(s) Oral daily    IVF:  cyanocobalamin Injectable 1000 MICROGram(s) IntraMuscular daily  folic acid 1 milliGRAM(s) Oral daily  multivitamin 1 Tablet(s) Oral daily  thiamine IVPB 500 milliGRAM(s) IV Intermittent every 8 hours      Vital Signs Last 24 Hrs  T(C): 36.5 (08 Jan 2018 17:09), Max: 37 (07 Jan 2018 21:10)  T(F): 97.7 (08 Jan 2018 17:09), Max: 98.6 (07 Jan 2018 21:10)  HR: 79 (08 Jan 2018 17:09) (61 - 79)  BP: 155/88 (08 Jan 2018 17:09) (148/75 - 169/81)  BP(mean): --  RR: 20 (08 Jan 2018 17:09) (18 - 20)  SpO2: 98% (08 Jan 2018 17:09) (96% - 99%)    PHYSICAL EXAM:  AAOx3, although some confusion and hearing difficulty, conversive, OES, moving b/l UE spontaneously, FC, speech clear  pupils: equal, round, reactive to light; Left upper visual field defect. Right vision intact. EOM intact. no facial droop, asymmetry, loss of sensation;   CN II- XII grossly intact.   negative clonus, negative francis; good reflexes all throughout.   Hip flexors to antigravity, knee flexor and extensor, dorsiflexion 3+/5 b/l; b/l UE 5/5  Respiratory: CTA b/l   Cardiovascular: RRR, S1, S2  LABS:                        11.2   12.2  )-----------( 350      ( 08 Jan 2018 09:13 )             35.3     01-08    141  |  102  |  13  ----------------------------<  116<H>  4.4   |  21<L>  |  1.03    Ca    9.4      08 Jan 2018 09:13  Mg     2.0     01-08          CULTURES:  Culture Results:   >100,000 CFU/ml Enterococcus faecalis (01-06 @ 08:49)  Culture Results:   No growth at 2 days. (01-05 @ 23:57)      RADIOLOGY & ADDITIONAL STUDIES:    Assessment:       Plan: HISTORY OF PRESENT ILLNESS: 83 yo M with PMHx of alcoholism, progressive dementia (as per wife), HTN, HLD was admitted for UTI and AMS. Neurosurgery was consulted for LE weakness. Patient was found laying on the floor of his apartment on Friday, january, 5. He had several episodes of fall for past few weeks as per family. Patient has no history of CAD or cardiac arrhythmia. Patient lives alone at home and ambulates slowly with cane at baseline. Patient has occasional incontinence since surgery for polyp removal vs. prostate ca? Patient is currently on Antibiotics for UTI. MRI of lumbar and thoracic spine shows multilevel degenerative disk disease, left sided/central disk portrusion of T5-T6 level and effacement of ventral thecal sac, cord impingement and diffuse disc bulges T10- T17 T 11-12 T 12-L1.      PAST MEDICAL & SURGICAL HISTORY:  HLD (hyperlipidemia)  HTN (hypertension)  No significant past surgical history    FAMILY HISTORY:  No pertinent family history in first degree relatives    SOCIAL HISTORY:  Tobacco Use:  EtOH use:   Substance:    Allergies    No Known Allergies    Intolerances      REVIEW OF SYSTEMS  General:	No acute distress.   Ophthalmologic: L visual changes/blurryness  Respiratory and Thorax: no breathing difficulties, cough  Cardiovascular:	no edema, sob  Gastrointestinal:	  Genitourinary:	UTI   Musculoskeletal:	 no pain, + weakness,   Neurological: progressive dementia as per family, unknown LOC    MEDICATIONS:  Antibiotics:    Neuro:  chlordiazePOXIDE 25 milliGRAM(s) Oral every 8 hours    Anticoagulation:  heparin  Injectable 5000 Unit(s) SubCutaneous every 8 hours    OTHER:  allopurinol 100 milliGRAM(s) Oral daily  amLODIPine   Tablet 2.5 milliGRAM(s) Oral daily  atorvastatin 40 milliGRAM(s) Oral at bedtime  diltiazem    milliGRAM(s) Oral daily    IVF:  cyanocobalamin Injectable 1000 MICROGram(s) IntraMuscular daily  folic acid 1 milliGRAM(s) Oral daily  multivitamin 1 Tablet(s) Oral daily  thiamine IVPB 500 milliGRAM(s) IV Intermittent every 8 hours      Vital Signs Last 24 Hrs  T(C): 36.5 (08 Jan 2018 17:09), Max: 37 (07 Jan 2018 21:10)  T(F): 97.7 (08 Jan 2018 17:09), Max: 98.6 (07 Jan 2018 21:10)  HR: 79 (08 Jan 2018 17:09) (61 - 79)  BP: 155/88 (08 Jan 2018 17:09) (148/75 - 169/81)  BP(mean): --  RR: 20 (08 Jan 2018 17:09) (18 - 20)  SpO2: 98% (08 Jan 2018 17:09) (96% - 99%)    PHYSICAL EXAM:  AAOx3, although some confusion and hearing difficulty, conversive, OES, moving b/l UE spontaneously, FC, speech clear  pupils: equal, round, reactive to light; Left upper visual field defect. Right vision intact. EOM intact. no facial droop, asymmetry, loss of sensation;   CN II- XII grossly intact.   negative clonus, negative francis; good reflexes all throughout.   Hip flexors to antigravity, knee flexor and extensor, dorsiflexion 3+/5 b/l; b/l UE 5/5  Respiratory: CTA b/l   Cardiovascular: RRR, S1, S2  LABS:                        11.2   12.2  )-----------( 350      ( 08 Jan 2018 09:13 )             35.3     01-08    141  |  102  |  13  ----------------------------<  116<H>  4.4   |  21<L>  |  1.03    Ca    9.4      08 Jan 2018 09:13  Mg     2.0     01-08        CULTURES:  Culture Results:   >100,000 CFU/ml Enterococcus faecalis (01-06 @ 08:49)  Culture Results:   No growth at 2 days. (01-05 @ 23:57)      RADIOLOGY & ADDITIONAL STUDIES:    Assessment:  83 yo M with degenerative disk disease, disc bulges and myelopathic changes.     - cervical MRI without contrast to rule out cord compression.     patient seen and examined with Dr. Rincon, neurosurgery attending and agrees with plan

## 2018-01-08 NOTE — PROGRESS NOTE ADULT - PROBLEM SELECTOR PLAN 7
History of HLD.  - Continue with atorvastatin 40mg qhs. Low at 242. Neuro recs starting B12 supplementation as per below  -Continue 1000mcg IM daily x 5 days (day 1 is 1/7) then weekly x 4, then 1000mcg PO daily.  -Switch to weekly B12 on 1/12  -F/u MMA, Folate, Homocysteine, Vit-D 25  -F/u Neuro recs

## 2018-01-08 NOTE — PROGRESS NOTE ADULT - PROBLEM SELECTOR PLAN 4
Low at 242. Neuro recs starting B12 supplementation as per below  -Continue 1000mcg IM daily x 5 days (day 1 is 1/7) then weekly x 4, then 1000mcg PO daily.  -Switch to weekly B12 on 1/12  -F/u MMA, Folate, Homocysteine, Vit-D 25  -F/u Neuro recs resolved

## 2018-01-08 NOTE — PROGRESS NOTE ADULT - PROBLEM SELECTOR PLAN 9
VTE ppx: HSQ  Dispo: 4 Uris  Code: FULL CODE History of HLD.  - Continue with atorvastatin 40mg qhs.

## 2018-01-08 NOTE — CONSULT NOTE ADULT - SUBJECTIVE AND OBJECTIVE BOX
Patient is a 84y old  Male who presents with a chief complaint of pt's doorman called 911 and pt found on his apartment floor in his underwears. pt BIBA to ER (06 Jan 2018 02:44)       HPI:  83 y/o M poor historian with PMHx of HTN, HLD, ?prostate cancer history, ?seizures (pharmacy had keppra as old medication), phimosis of the penis, BIBEMS with AMS x 1 day. Patient lives alone, usually walks slowly with a cane. Staff had not seen patient in a few days and acquaintances have not been able to reach him for a few hours. Patient was found laying on the floor in his apartment, incontinent of feces, unable to explain how he ended up on the floor. No signs of trauma at the scene, unknown LOC. Was last normal two days ago as per superintendent. Patient reports a recent fall two weeks ago where he tripped on his rug. Does not recall hitting his head or losing consciousness. Otherwise denies fever, chills, lightheadedness, CP, palpitations, SOB, cough, URI symptoms, N/V/D/C, abdominal pain, dysuria, hematuria, changes in bowel habits, LE edema. States that he was recently at [facility] for a "blood exchange because of the allopurinol" and "the blood in his sugar." Reports having a wife who lives in a different apartment because "she works from home."    In the ED, VS T 97.2, HR 81, /100, RR 16, O2 96 on RA. Labs notable for leukocytosis to 12.1, Hgb 11.6, BUN/Cr 25/1.13, , trop 0.02. EtOH negative. U tox pending. Given 1L NS bolus x 2 in ED.    PMHx: as above  Surg hx: as above  Meds: allopurinol 100 qd, atorvastatin 40 qhs, dilt  qd, amlodipine 2.5 qd, olmasartan 40mg qd (confirmed with Duane Reade)  All: NKDA  Soc hx: former smoker, smoked 20 years x 2ppd, 3/4 bottle of wine per night. No recreational drugs.  Fhx: non-contributory    Additional information obtained via outpatient [employer] notes:  TTE 8/2017: normal LV size/function, no WMA.   Labs Hgb baseline 12.6  Cr baseline 1.21  Total chol 214 (06 Jan 2018 01:24)      PAST MEDICAL & SURGICAL HISTORY:  HLD (hyperlipidemia)  HTN (hypertension)  No significant past surgical history      MEDICATIONS  (STANDING):  allopurinol 100 milliGRAM(s) Oral daily  amLODIPine   Tablet 2.5 milliGRAM(s) Oral daily  atorvastatin 40 milliGRAM(s) Oral at bedtime  cefTRIAXone   IVPB 1 Gram(s) IV Intermittent every 24 hours  chlordiazePOXIDE 25 milliGRAM(s) Oral every 8 hours  cyanocobalamin Injectable 1000 MICROGram(s) IntraMuscular daily  diltiazem    milliGRAM(s) Oral daily  folic acid 1 milliGRAM(s) Oral daily  heparin  Injectable 5000 Unit(s) SubCutaneous every 8 hours  multivitamin 1 Tablet(s) Oral daily  thiamine IVPB 500 milliGRAM(s) IV Intermittent every 8 hours    MEDICATIONS  (PRN):      Social History: former director of [employer], lives alone in an elevator accessible apartment building, no previous home care services    Functional Level Prior to Admission: reports ADL independent, walks with a cane    FAMILY HISTORY:  No pertinent family history in first degree relatives      CBC Full  -  ( 08 Jan 2018 09:13 )  WBC Count : 12.2 K/uL  Hemoglobin : 11.2 g/dL  Hematocrit : 35.3 %  Platelet Count - Automated : 350 K/uL  Mean Cell Volume : 89.8 fL  Mean Cell Hemoglobin : 28.5 pg  Mean Cell Hemoglobin Concentration : 31.7 g/dL  Auto Neutrophil # : x  Auto Lymphocyte # : x  Auto Monocyte # : x  Auto Eosinophil # : x  Auto Basophil # : x  Auto Neutrophil % : x  Auto Lymphocyte % : x  Auto Monocyte % : x  Auto Eosinophil % : x  Auto Basophil % : x      01-08    141  |  102  |  13  ----------------------------<  116<H>  4.4   |  21<L>  |  1.03    Ca    9.4      08 Jan 2018 09:13  Mg     2.0     01-08              Radiology:    < from: CT Head No Cont (01.05.18 @ 23:33) >  EXAM:  CT BRAIN                          PROCEDURE DATE:  01/05/2018                     INTERPRETATION:  CT OF THE HEAD WITHOUT CONTRAST    INDICATION:  ams    TECHNIQUE: An axial noncontrast CT scan of the head was performed.   Sagittal and coronal reformatted images were also obtained.    CONTRAST:  None    COMPARISON:  None    FINDINGS:  No hydrocephalus, midline shift, focal mass effect, acute intracranial   hemorrhage or infarct. Chronic white matter microangiopathic ischemic   disease. Encephalomalacia involving the inferior left frontal lobe,   either sequela of prior trauma or infarction. Generalized cerebral volume   loss. Mucous retention cyst within the right maxillary sinus. Small   air-fluid level within the right sphenoid sinus. Calvarium intact.    IMPRESSION:  No hydrocephalus, midline shift, acute intracranial hemorrhage or   demarcated territorial infarct. Other findings as above.            PROCEDURE DATE:  01/06/2018                     INTERPRETATION:  Thoracic spine MR without contrast    Clinicalindication: Thoracic back pain. Weakness in the lower   extremities bilaterally.    Technique: Sagittal T1, sagittal T2, sagittal inversion recovery, and   axial T2 imaging is obtained of the thoracic spine without the use of   contrast.    Comparison: None.    Findings:    There is slight kyphosis in the thoracic spine. There is heterogeneity of   marrow signal intensity on the short TR scans consistent osteopenia, but   there is a background of predominant fatty replacement, which is   commensurate with patient age. No spinal canal mass is seen. Conus   medullaris ends normally at the T12-L1 level.    There is multilevel degenerative disc disease. Findings include loss of   normal disc space signal on the long TR scans and loss of disc space   height. There is multilevel degenerative osteoarthritis present. Findings   include marginal osteophytes anteriorly.    At the T5-T6 level, there is a central and left-sided disc protrusion   with effacement of the ventral thecal sac and cord impingement with faint   abnormal T2 prolongation seen within the intramedullary intradural   thoracic cord at this level suggestive of myelomalacia and/or gliosis   and/or edema.  There is partial fusion across the T6-T7 and T8-T9 disc space   compartments.  There are diffuse disc bulges from T10-T11 through to the T12-L1 levels   inclusive. There are multiple Schmorl's nodes identified within the   thoracic spine particularly involving the intervertebral disc space   compartments at the T5-T6 and T10-T11 levels. There is discogenic   sclerosis noted at multiple intervertebral disc space compartments in the   mid and distal thoracic spine.    Impression:    1. Multilevel degenerative disc disease including central and left-sided   disc protrusionT5-T6 level with effacement of ventral thecal sac and   cord impingement with faint abnormal T2 prolongation within   intramedullary intradural thoracic cord suggestive of myelomalacia and/or   gliosis and/or edema.  2. Diffuse disc bulges T10-T11, T11-T12, and T12-L1 levels.  3. Multilevel degenerative osteoarthritis.          < from: MR Lumbar Spine No Cont (01.06.18 @ 19:26) >  EXAM:  MR SPINE LUMBAR                          PROCEDURE DATE:  01/06/2018                     INTERPRETATION:  MRI OF THE LUMBAR SPINE WITHOUT CONTRAST    HISTORY: Lower extremity weakness bilaterally.    TECHNIQUE:     Sagittal T1, sagittal T2 with and without fat suppression, axial T1, and   axial T2 weighted imaging of the lumbar spine is obtained. No contrast   was given.    COMPARISON: None.    FINDINGS:    On the coronal localizer, there is curvature in the lumbar spine, convex   to theleft, apex maximum at the L2-L3 level. There is heterogeneity of   marrow signal intensity on the short TR scans consistent with osteopenia,   but there is a background of predominant fatty replacement of marrow,   which is commensurate with patient age. No spinal canal mass is seen.   Conus medullaris ends normally at the T12-L1 level. There is prominent   epidural fat noted in the lumbosacral canal.    Multilevel degenerative disc disease is present. Findings include loss of   normal disc spacesignal on the long TR scans and loss of disc space   height. There is vacuum phenomenon noted at the L5-S1 level. There is   abnormal T2 shortening and T2 prolongation within the L2-L3   intervertebral disc space suggestive of ankylosis as well as degenerative   edema along the right aspect of this intervertebral disc. There is   discogenic sclerosis at the L3-L4, L4-L5, and L5-S1 intervertebral disc   space compartments. There is degenerative edema within the intervertebral   disc space at the L3-L4 level. Multilevel degenerative osteoarthritis is   present. Findings include ligamentum flavum thickening, facet joint   hypertrophic osteophytes, and marginal osteophytes anteriorly.     At the T12-L1 level, there may be a shallow right paracentral disc   protrusion on background of diffuse disc bulge. There is no central canal   stenosis or foraminal stenosis.    At the L1-L2 level, left foraminal-lateral disc protrusion on background   of diffuse disc bulge is noted. There is marked left-sided foraminal   narrowing and left lateral recess stenosis which may be affecting the   left L1 foraminal nerve root. No central canal stenosis is present.    At the L2-L3 level, developing ankylosis of this disc space compartment   with diffuse bulge and probable central disc protrusion resulting in   thecal sac effacement and mild central canal stenosis. There is foraminal   narrowing bilaterally, right more than left, lateral recess stenosis,   right more than left.    At the L3-L4 level, central disc protrusion is present on background of   diffuse disc bulge. There is moderate to severe foraminal narrowing   bilaterally and lateral recess stenosis bilaterally which may be   affecting either of the traversing L4 nerve roots bilaterally orthe   foraminal L3 nerve roots bilaterally. There is moderate central canal   stenosis, which relates to facet arthrosis and disc disease.    At the L4-L5 level, large central disc protrusion is present. There is   moderate to severe central canal stenosis. There is moderate to severe   lateral recess stenosis bilaterally and foraminal narrowing bilaterally   which may be affecting either of the traversing L5 nerve roots   bilaterally or the L4 foraminal nerve roots bilaterally.    At the L5-S1 level, left subarticular disc protrusion is present with   moderate effacement of thecal sac resulting in mild central canal   stenosis. There is left more than right foraminal narrowing and lateral   recess stenosis. This may be affecting the traversing left S1 or   foraminal left L5 nerve roots.    IMPRESSION:    1. Central canal stenosis from L2-L3 through to the L5-S1 levels,   greatest at the L4-L5 level, which relates to acquired etiologies from   facet arthrosis and disc disease.  2. Multilevel degenerative disc disease with disc protrusions at all   levels of the lumbar spine in which the largest disc protrusions are   noted at the L4-L5 level with moderate to severe foraminal stenosis   bilaterally and lateral recess stenosis bilaterally, which may be   affecting either of the traversing L5 nerve roots bilaterally or L4   foraminal nerve roots bilaterally and at the L5-S1 level with marked left   foraminal narrowing and lateral recess stenosis which may be affecting   the traversing left S1 or foraminal left L5 nerve roots.  3. Multilevel degenerative facet arthrosis resulting in multilevel   foraminal stenosis, greatest at the L3-L4 and L4-L5 levels bilaterally   and on the left at the L5-S1 level.  4. Levoscoliosis.              Vital Signs Last 24 Hrs  T(C): 36.7 (08 Jan 2018 09:59), Max: 37 (07 Jan 2018 21:10)  T(F): 98.1 (08 Jan 2018 09:59), Max: 98.6 (07 Jan 2018 21:10)  HR: 72 (08 Jan 2018 09:59) (61 - 73)  BP: 148/75 (08 Jan 2018 09:59) (148/75 - 169/81)  BP(mean): --  RR: 20 (08 Jan 2018 09:59) (18 - 20)  SpO2: 97% (08 Jan 2018 09:59) (96% - 99%)    REVIEW OF SYSTEMS:    CONSTITUTIONAL: No fever, weight loss, or fatigue  EYES: No eye pain, visual disturbances, or discharge  ENMT:  No difficulty hearing, tinnitus, vertigo; No sinus or throat pain  NECK: No pain or stiffness  BREASTS: No pain, masses, or nipple discharge  RESPIRATORY: No cough, wheezing, chills or hemoptysis; No shortness of breath  CARDIOVASCULAR: No chest pain, palpitations, dizziness, or leg swelling  GASTROINTESTINAL: No abdominal or epigastric pain. No nausea, vomiting, or hematemesis; No diarrhea or constipation. No melena or hematochezia.  GENITOURINARY: No dysuria, frequency, hematuria, or incontinence  NEUROLOGICAL: leg weakness  SKIN: No itching, burning, rashes, or lesions   LYMPH NODES: No enlarged glands  ENDOCRINE: No heat or cold intolerance; No hair loss  MUSCULOSKELETAL: No joint pain or swelling; No muscle, back, or extremity pain  PSYCHIATRIC: No depression, anxiety, mood swings, or difficulty sleeping  HEME/LYMPH: No easy bruising, or bleeding gums  ALLERGY AND IMMUNOLOGIC: No hives or eczema      Physical Exam: elderly appearing  gentleman lying in bed, c/o leg weakness    HEENT: normocephalic,  anicteric,  atraumatic    Neck: supple, negative JVD, negative carotid bruits,    Chest: CTA bilaterally, neg wheeze, rhonchi, rales, crackles, egophany    Cardiovascular: regular rate and rhythm, neg murmurs/rubs/gallops    Abdomen: soft, non distended, non tender, negative rebound/guarding, normal bowel sounds, neg hepatosplenomegaly    Extremities: WWP, neg cyanosis/clubbing/edema, negative calf tenderness to palpation, negative Daniela's sign    :     Neurologic Exam:    Alert and oriented to person, place, date/year, speech fluent w/o dysarthria, repetition intact, comprehension intact,     Cranial Nerves:     II:                       pupils equal, round and reactive to light, visual fields intact   III/ IV/VI:             extraocular movements intact, neg nystagmus, ptosis  V:                      facial sensation intact, V1-3 normal  VII:                     face symmetric, no droop, normal eye closure and smile  VIII:                    hearing intact to finger rub bilaterally  IX/ X:                  soft palate rise symmetrical  XI:                      head turning, shoulder shrug normal  XII:                     tongue midline    Motor Exam:    Bilateral UE:         5/5 /intrinsics                            5/5 biceps/triceps/wrist extensors-flexors/deltoid                            negative pronator drift      Bilateral LE:         2+ to 3-/5 hip flexors/adductors/abductors                            4/5 quadriceps/hamstrings                            4/5 dorsiflexors/plantar flexors/invertors-evertors    Sensory: intact to LT/PP in all UE/LE dermatomes    DTR:        = biceps/     triceps/     brachioradialis                 = patella/   medial hamstring/    ankle                 neg clonus                 neg Babinski                 neg Hoffmans    Joint Position Sense:  intact    Romberg: NT    Tandem Walking: NT    Gait:  NT        PM&R Impression:    1) deconditioned  2) proximal LE weakness/ lumbar spinal stenosis  3) gait dysfunction      Recommendations:    1) Physical therapy focusing on therapeutic exercises, bed mobility/transfer out of bed evaluation, progressive ambulation with assistive devices.    2) Disposition Plan: recommend subacute rehab placement

## 2018-01-08 NOTE — PROGRESS NOTE ADULT - PROBLEM SELECTOR PLAN 1
Patient confused upon arrival to ED. AOx2 on arrival, AOx3 on admission exam -- slightly improved. AOx2-3 this AM. Collateral obtained this AM from patient's wife who states that patient is alcoholic who starts drinking at 10 AM daily, has a baseline confused mental status where she needs to often remind him of things and sometimes translate his speech since others do not always understand him.  Can't remember details, names etc. Reportedly not compliant with treatment. Was at [facility] in October for urologic procedure in which "polyps removed from bladder"  and "peeing blood" and patient did not follow up. Has PMH hip replacement. CIWA < 8.  - F/u Neuro recs  - Obtain collateral from Dr. Romero Alvarado (PMD)  - Banana bag, thiamine 500mg q8h, folate, and MVI given heavy EtOH use. resolved  back as baseline MS

## 2018-01-08 NOTE — PROGRESS NOTE ADULT - PROBLEM SELECTOR PLAN 8
F: no IVF  E: replete electrolytes prn.  N: regular diet. Patient with recent reported fall at home. Likely in setting of EtOH based on history. Unlikely arrhythmogenic given EKG NSR.  - PT consulted

## 2018-01-08 NOTE — PROGRESS NOTE ADULT - SUBJECTIVE AND OBJECTIVE BOX
NEUROLOGY FOLLOW-UP CONSULT NOTE    CHIEF COMPLAINT:      INTERVAL HISTORY:      REVIEW OF SYSTEMS:  As per HPI, otherwise negative for Constitutional, Eyes, Ears/Nose/Mouth/Throat, Neck, Cardiovascular, Respiratory, Gastrointestinal, Genitourinary, Skin, Endocrine, Musculoskeletal, Psychiatric, and Hematologic/Lymphatic.    MEDICATIONS:  allopurinol 100 milliGRAM(s) Oral daily  amLODIPine   Tablet 2.5 milliGRAM(s) Oral daily  atorvastatin 40 milliGRAM(s) Oral at bedtime  cefTRIAXone   IVPB 1 Gram(s) IV Intermittent every 24 hours  cyanocobalamin Injectable 1000 MICROGram(s) IntraMuscular daily  diltiazem    milliGRAM(s) Oral daily  folic acid 1 milliGRAM(s) Oral daily  heparin  Injectable 5000 Unit(s) SubCutaneous every 8 hours  multivitamin 1 Tablet(s) Oral daily  multivitamin/thiamine/folic acid in sodium chloride 0.9% 1000 milliLiter(s) IV Continuous <Continuous>  thiamine IVPB 500 milliGRAM(s) IV Intermittent every 8 hours    VITAL SIGNS:  Vital Signs Last 24 Hrs  T(C): 36.4 (08 Jan 2018 05:26), Max: 37 (07 Jan 2018 21:10)  T(F): 97.6 (08 Jan 2018 05:26), Max: 98.6 (07 Jan 2018 21:10)  HR: 61 (08 Jan 2018 05:26) (61 - 73)  BP: 157/84 (08 Jan 2018 05:26) (155/78 - 169/81)  BP(mean): --  RR: 19 (08 Jan 2018 05:26) (18 - 19)  SpO2: 96% (08 Jan 2018 05:26) (96% - 99%)    PHYSICAL EXAMINATION:  General: Well-developed, well nourished, in no acute distress.  Eyes: Conjunctiva and sclera clear.  Cardiovascular: Regular rate and rhythm; S1 and S2 Normal; No murmurs, gallops or rubs.  Neurologic:  - Mental Status:  Alert, awake, oriented to person, place, and time; - Cranial Nerves II-XII:    II:  Pupils are equal, round, and reactive to light.  III, IV, VI:  Extraocular movements are intact without nystagmus.  V:  Facial sensation is intact in the V1-V3 distribution bilaterally.  VII:  Face is symmetric with normal eye closure and smile  VIII:  Hearing is intact to finger rub.  IX, X:  Uvula is midline and soft palate rises symmetrically  XI:  Head turning and shoulder shrug are intact.  XII:  Tongue protrudes in the midline.  - Motor:  5/5 UE symmetric; LE 2/5 hip flexion, 4- knee flex/ext, 4+ ankle dorsiflexion  - Reflexes:  2+ UE symmetric; 3+ patellar, absent achilles b/l  - Sensory:  Intact to light touch, pin prick, vibration, and joint-position sense throughout.  - Coordination:  Finger-nose-finger and heel-knee-shin intact without dysmetria.  - Gait: deferred     LABS:                          10.3   10.6  )-----------( 290      ( 07 Jan 2018 08:44 )             32.8     01-07    141  |  107  |  19  ----------------------------<  91  4.9   |  21<L>  |  1.02    Ca    8.6      07 Jan 2018 08:44  Mg     2.0     01-07    RADIOLOGY & ADDITIONAL STUDIES:        IMPRESSION & PLAN:    Leg weakness likely partially due to severe lumbar degenerative disease. Given this is due to multilevel disease and not one particular area of compression, surgical decompression would be need to be extensive and higher risk than single level laminectomy. Consider spine consult, but per my conversation with him patient not interested in surgery at this time.     B12 level is low at 242  Continue B12 1000mcg IM daily x 5 days then weekly x 4, then 1000mcg PO daily.    PT eval     Will discuss plan with attending and update. NEUROLOGY FOLLOW-UP CONSULT NOTE    CHIEF COMPLAINT:  AMS, lower extremity weakness    INTERVAL HISTORY:  Improved MS, still with lower extremity weakness.     REVIEW OF SYSTEMS:  Complains of lower extremity weakness.     MEDICATIONS:  allopurinol 100 milliGRAM(s) Oral daily  amLODIPine   Tablet 2.5 milliGRAM(s) Oral daily  atorvastatin 40 milliGRAM(s) Oral at bedtime  cefTRIAXone   IVPB 1 Gram(s) IV Intermittent every 24 hours  cyanocobalamin Injectable 1000 MICROGram(s) IntraMuscular daily  diltiazem    milliGRAM(s) Oral daily  folic acid 1 milliGRAM(s) Oral daily  heparin  Injectable 5000 Unit(s) SubCutaneous every 8 hours  multivitamin 1 Tablet(s) Oral daily  multivitamin/thiamine/folic acid in sodium chloride 0.9% 1000 milliLiter(s) IV Continuous <Continuous>  thiamine IVPB 500 milliGRAM(s) IV Intermittent every 8 hours    VITAL SIGNS:  Vital Signs Last 24 Hrs  T(C): 36.4 (08 Jan 2018 05:26), Max: 37 (07 Jan 2018 21:10)  T(F): 97.6 (08 Jan 2018 05:26), Max: 98.6 (07 Jan 2018 21:10)  HR: 61 (08 Jan 2018 05:26) (61 - 73)  BP: 157/84 (08 Jan 2018 05:26) (155/78 - 169/81)  BP(mean): --  RR: 19 (08 Jan 2018 05:26) (18 - 19)  SpO2: 96% (08 Jan 2018 05:26) (96% - 99%)    PHYSICAL EXAMINATION:  General: Well-developed, well nourished, in no acute distress.  Eyes: Conjunctiva and sclera clear.  Cardiovascular: RRR; S1 and S2 Normal; No murmurs, gallops or rubs.  Neurologic:  - Mental Status:  AAOx1;   - Cranial Nerves II-XII:    II:  PERRLA  III, IV, VI:  EOMI  V:  Facial sensation is intact in the V1-V3 distribution bilaterally.  VII:  Face is symmetric with normal eye closure and smile  VIII:  Hearing is intact to finger rub.  IX, X:  Uvula is midline and soft palate rises symmetrically  XI:  Head turning and shoulder shrug are intact.  XII:  Tongue protrudes in the midline.  - Motor:  5/5 UE symmetric; LE 2/5 hip flexion, 4- knee flex/ext, 4+ ankle dorsiflexion  - Reflexes:  2+ UE symmetric; 3+ on LLE, 2+ RLE  - Sensory:  Intact to light touch, pin prick, vibration, and joint-position sense throughout.  - Coordination:  Finger-nose-finger and heel-knee-shin intact without dysmetria.  - Gait: deferred     LABS:                          10.3   10.6  )-----------( 290      ( 07 Jan 2018 08:44 )             32.8     01-07    141  |  107  |  19  ----------------------------<  91  4.9   |  21<L>  |  1.02    Ca    8.6      07 Jan 2018 08:44  Mg     2.0     01-07    RADIOLOGY & ADDITIONAL STUDIES:    < from: MR Lumbar Spine No Cont (01.06.18 @ 19:26) >  IMPRESSION:    1. Central canal stenosis from L2-L3 through to the L5-S1 levels,   greatest at the L4-L5 level, which relates to acquired etiologies from   facet arthrosis and disc disease.  2. Multilevel degenerative disc disease with disc protrusions at all   levels of the lumbar spine in which the largest disc protrusions are   noted at the L4-L5 level with moderate to severe foraminal stenosis   bilaterally and lateral recess stenosis bilaterally, which may be   affecting either of the traversing L5 nerve roots bilaterally or L4   foraminal nerve roots bilaterally and at the L5-S1 level with marked left   foraminal narrowing and lateral recess stenosis which may be affecting   the traversing left S1 or foraminal left L5 nerve roots.  3. Multilevel degenerative facet arthrosis resulting in multilevel   foraminal stenosis, greatest at the L3-L4 and L4-L5 levels bilaterally   and on the left at the L5-S1 level.  4. Levoscoliosis.    < end of copied text >    < from: MR Thoracic Spine No Cont (01.06.18 @ 19:17) >    Impression:    1. Multilevel degenerative disc disease including central and left-sided   disc protrusionT5-T6 level with effacement of ventral thecal sac and   cord impingement with faint abnormal T2 prolongation within   intramedullary intradural thoracic cord suggestive of myelomalacia and/or   gliosis and/or edema.  2. Diffuse disc bulges T10-T11, T11-T12, and T12-L1 levels.  3. Multilevel degenerative osteoarthritis.    IMPRESSION & PLAN:    Leg weakness:   MRI showing disk bulges and degeneration with some impingement of the spinal cord. Leg weakness likely chronic, with acute component in setting of UTI. PT consult. Spine consult.   Continue B12 1000mcg IM daily x 5 days then weekly x 4, then 1000mcg PO daily.    Dementia: As per wife, patient with progressive dementia and worsened by ETOH abuse. Continue on Thiamine. No other neurological intervention at this time.

## 2018-01-08 NOTE — CONSULT NOTE ADULT - ASSESSMENT
83 y/o M poor historian with PMHx of HTN, HLD, ?prostate cancer history, phimosis of the penis, presents to ED with AMS.       Problem/Plan - 1:  ·  Problem: Alcohol abuse.  Plan: Patient confused upon arrival to ED. AOx2 on arrival, AOx3 on admission exam -- slightly improved. AOx2-3 this AM. Collateral obtained this AM from patient's wife who states that patient is alcoholic who starts drinking at 10 AM daily, has a baseline confused mental status where she needs to often remind him of things and sometimes translate his speech since others do not always understand him.  Can't remember details, names etc. Reportedly not compliant with treatment. Was at [facility] in October for urologic procedure in which "polyps removed from bladder"  and "peeing blood" and patient did not follow up. Has [facility] hip replacement. CIWA < 8.  - F/u Neuro recs  - Obtain collateral from Dr. Romero Alvarado (PMD)  - Banana bag, thiamine 500mg q8h, folate, and MVI given heavy EtOH use.     Problem/Plan - 2:  ·  Problem: UTI (urinary tract infection).  Plan: Resolved. Patient initially septic, meeting 2/4 SIRS (hypothermia, leukocytosis), LA 1.2, with AMS, AOx2 initially. UA positive. Abbott placed, now removed and passed TOV. Continues w/ mild leukocytosis today, and reports frequency and incontinence  - Continuing on ceftriaxone 1g q24h.  - Follow up urine culture for speciation and sensitivity.     Problem/Plan - 3:  ·  Problem: Leg weakness.  Plan: MRI performed. Per Neuro partially due to severe multilevel lumbar degenerative disease.   -Spine consult, although patient may not be interested in surgery  -F/u Neuro recs  -PT.     Problem/Plan - 4:  ·  Problem: B12 deficiency.  Plan: Low at 242. Neuro recs starting B12 supplementation as per below  -Continue 1000mcg IM daily x 5 days (day 1 is 1/7) then weekly x 4, then 1000mcg PO daily.  -Switch to weekly B12 on 1/12  -F/u MMA, Folate, Homocysteine, Vit-D 25  -F/u Neuro recs.     Problem/Plan - 5:  ·  Problem: Fall.  Plan: Patient with recent reported fall at home. Likely in setting of EtOH based on history. Unlikely arrhythmogenic given EKG NSR.

## 2018-01-08 NOTE — PROGRESS NOTE ADULT - SUBJECTIVE AND OBJECTIVE BOX
INTERVAL HPI/OVERNIGHT EVENTS: Abbott removed around 4PM yesterday. Neuro note recs starting B12 and considering spine surgery consult based on findings of MRI. Passed TOV overnight    SUBJECTIVE: Patient seen and examined at bedside. No overnight events.    ROS:  CV: Denies chest pain  Resp: Denies SOB  GI: Denies abdominal pain, diarrhea, nausea, vomiting  ID: Denies fevers, chills    OBJECTIVE:    VITALS  Vital Signs Last 24 Hrs  T(C): 36.7 (08 Jan 2018 09:59), Max: 37 (07 Jan 2018 21:10)  T(F): 98.1 (08 Jan 2018 09:59), Max: 98.6 (07 Jan 2018 21:10)  HR: 72 (08 Jan 2018 09:59) (61 - 73)  BP: 148/75 (08 Jan 2018 09:59) (148/75 - 169/81)  BP(mean): --  RR: 20 (08 Jan 2018 09:59) (18 - 20)  SpO2: 97% (08 Jan 2018 09:59) (96% - 99%)    I&O's Summary    07 Jan 2018 07:01  -  08 Jan 2018 07:00  --------------------------------------------------------  IN: 0 mL / OUT: 550 mL / NET: -550 mL    PHYSICAL EXAM  General: A&Ox 3; NAD  Eyes: PERRL; EOM grossly intact; no scleral icterus  ENMT: MMM, no pharyngeal erythema/exudate  Neck: Supple; no LAD  Respiratory: CTAB; no W/R/R auscultated; good air movement  Cardiovascular: RRR; S1/S2  Gastrointestinal: Soft; NTND without guarding; bowel sounds present  Extremities: WWP; no edema; radial/pedal pulses palpable  Neurological:  CNII-XII grossly intact    MEDICATIONS  (STANDING):  allopurinol 100 milliGRAM(s) Oral daily  amLODIPine   Tablet 2.5 milliGRAM(s) Oral daily  atorvastatin 40 milliGRAM(s) Oral at bedtime  cefTRIAXone   IVPB 1 Gram(s) IV Intermittent every 24 hours  cyanocobalamin Injectable 1000 MICROGram(s) IntraMuscular daily  diltiazem    milliGRAM(s) Oral daily  folic acid 1 milliGRAM(s) Oral daily  heparin  Injectable 5000 Unit(s) SubCutaneous every 8 hours  multivitamin 1 Tablet(s) Oral daily  thiamine IVPB 500 milliGRAM(s) IV Intermittent every 8 hours    LABS                        11.2   12.2  )-----------( 350      ( 08 Jan 2018 09:13 )             35.3     01-08    141  |  102  |  13  ----------------------------<  116<H>  4.4   |  21<L>  |  1.03    Ca    9.4      08 Jan 2018 09:13  Mg     2.0     01-08    CARDIAC MARKERS ( 06 Jan 2018 15:47 )  x     / 0.02 ng/mL / x     / x     / x        ALLERGIES:  No Known Allergies    RADIOLOGY and Additional Tests reviewed. INTERVAL HPI/OVERNIGHT EVENTS: Abbott removed around 4PM yesterday. Neuro note recs starting B12 and considering spine surgery consult based on findings of MRI. Passed TOV overnight    SUBJECTIVE: Patient seen and examined at bedside. Complains of feeling "fragile" and leg weakness. Also reports increased urination/incontinence. Otherwise no complaints    ROS:  CV: Denies chest pain  Resp: Denies SOB  GI: Denies abdominal pain, diarrhea, nausea, vomiting  ID: Denies fevers, chills    OBJECTIVE:    VITALS  Vital Signs Last 24 Hrs  T(C): 36.7 (08 Jan 2018 09:59), Max: 37 (07 Jan 2018 21:10)  T(F): 98.1 (08 Jan 2018 09:59), Max: 98.6 (07 Jan 2018 21:10)  HR: 72 (08 Jan 2018 09:59) (61 - 73)  BP: 148/75 (08 Jan 2018 09:59) (148/75 - 169/81)  BP(mean): --  RR: 20 (08 Jan 2018 09:59) (18 - 20)  SpO2: 97% (08 Jan 2018 09:59) (96% - 99%)    I&O's Summary    07 Jan 2018 07:01  -  08 Jan 2018 07:00  --------------------------------------------------------  IN: 0 mL / OUT: 550 mL / NET: -550 mL    PHYSICAL EXAM  General: elderly man in NAD sitting up in bed, awake  Eyes: EOM grossly intact; no scleral icterus  ENMT: MMM, no pharyngeal erythema/exudate; faint crusted lesions on lips (patient states from a fall)  Respiratory: CTAB; no W/R/R auscultated; good air movement  Cardiovascular: RRR; S1/S2  Gastrointestinal: Soft; NTND without guarding; bowel sounds present  Extremities: WWP; no edema; radial/pedal pulses palpable  Neurological: AOx3; CNII-XII grossly intact; LEs 2/5 motor b/L w/ L slightly < R; UEs 5/5 b/L; SILT    MEDICATIONS  (STANDING):  allopurinol 100 milliGRAM(s) Oral daily  amLODIPine   Tablet 2.5 milliGRAM(s) Oral daily  atorvastatin 40 milliGRAM(s) Oral at bedtime  cefTRIAXone   IVPB 1 Gram(s) IV Intermittent every 24 hours  cyanocobalamin Injectable 1000 MICROGram(s) IntraMuscular daily  diltiazem    milliGRAM(s) Oral daily  folic acid 1 milliGRAM(s) Oral daily  heparin  Injectable 5000 Unit(s) SubCutaneous every 8 hours  multivitamin 1 Tablet(s) Oral daily  thiamine IVPB 500 milliGRAM(s) IV Intermittent every 8 hours    LABS                        11.2   12.2  )-----------( 350      ( 08 Jan 2018 09:13 )             35.3     01-08    141  |  102  |  13  ----------------------------<  116<H>  4.4   |  21<L>  |  1.03    Ca    9.4      08 Jan 2018 09:13  Mg     2.0     01-08    CARDIAC MARKERS ( 06 Jan 2018 15:47 )  x     / 0.02 ng/mL / x     / x     / x        ALLERGIES:  No Known Allergies    RADIOLOGY and Additional Tests reviewed.

## 2018-01-08 NOTE — PROGRESS NOTE ADULT - ATTENDING COMMENTS
Pt seen and examined by me at bedside earlier in AM. Agree with housestaff's exam/a/p as noted above with edits  VSS  comfortable  AAox2  +bs/nt/nd  sensation intact of LEs, 2-3/5 muscle strength of LEs.  labs reviewed     a/p:  Toxic metabolic encephalopathy 2/2 UTI; appreciate neuro consults, can change thiamine to po  UTI due to enterococcus: start amoxicillin for total of 5days  DARRIUS-resolved  ETOH abuse: ok with tapering off librium  B12 deficiency: on B12 supplement  Lower extremity weakness likely 2/2 severe spinal stenosis: spine consult, MRI finding noted  leukocytosis likely 2/2 abx not covering enterococcus UTI  Agree with rest of a/p as above

## 2018-01-08 NOTE — PROGRESS NOTE ADULT - PROBLEM SELECTOR PLAN 3
MRI performed. Per Neuro partially due to severe multilevel lumbar degenerative disease.   -Spine consult, although patient may not be interested in surgery  -F/u Neuro recs  -PT Patient confused upon arrival to ED. AOx2 on arrival, AOx3 on admission exam -- slightly improved. AOx2-3 this AM. Collateral obtained this AM from patient's wife who states that patient is alcoholic who starts drinking at 10 AM daily, has a baseline confused mental status where she needs to often remind him of things and sometimes translate his speech since others do not always understand him.  Can't remember details, names etc. Reportedly not compliant with treatment. Was at [facility] in October for urologic procedure in which "polyps removed from bladder"  and "peeing blood" and patient did not follow up. Has [facility]H hip replacement. CIWA < 8.  - F/u Neuro recs  - Obtain collateral from Dr. Romero Alvarado (PMD)  - Banana bag, thiamine 500mg q8h, folate, and MVI given heavy EtOH use.

## 2018-01-08 NOTE — PROGRESS NOTE ADULT - ATTENDING COMMENTS
Pt seen and examined.      Pt was alert but clearly cognitively impaired, though with language relatively intact.  Perseverating on having a 4pm appointment to cash in checks.  perrl, eomi, face symm, tup ml  5/5 throughout in UEs.  At least 4/5 in R HF and 3-/5 in L HF.  PF/DF at least 4+/5 BL.  L patellar 3+ > R patellar 3.  AJs appear brisk but symm.  toes mute.  coord intact to FTN    AP: AMS is apparently chronic, likely dementia. Pt seen and examined.      Pt was alert but clearly cognitively impaired, though with language relatively intact.  Perseverating on having a 4pm appointment to cash in checks.  perrl, eomi, face symm, tup ml  5/5 throughout in UEs.  At least 4/5 in R HF and 3-/5 in L HF.  PF/DF at least 4+/5 BL.  L patellar 3+ > R patellar 3.  AJs appear brisk but symm.  toes mute.  coord intact to FTN    AP: AMS is apparently chronic, likely dementia.  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    - please call spine surgery consult  - continuing thiamine is reasonable given chronic etoh use and dementia.  - will follow

## 2018-01-08 NOTE — PROGRESS NOTE ADULT - PROBLEM SELECTOR PLAN 2
Resolved. Patient initially septic, meeting 2/4 SIRS (hypothermia, leukocytosis), LA 1.2, with AMS, AOx2 initially. UA positive. Abbott placed, now removed and passed TOV. Continues w/ mild leukocytosis today, and reports frequency and incontinence  - Continuing on ceftriaxone 1g q24h.  - Follow up urine culture for speciation and sensitivity.

## 2018-01-09 DIAGNOSIS — Z29.9 ENCOUNTER FOR PROPHYLACTIC MEASURES, UNSPECIFIED: ICD-10-CM

## 2018-01-09 LAB
ANION GAP SERPL CALC-SCNC: 20 MMOL/L — HIGH (ref 5–17)
BUN SERPL-MCNC: 12 MG/DL — SIGNIFICANT CHANGE UP (ref 7–23)
CALCIUM SERPL-MCNC: 9 MG/DL — SIGNIFICANT CHANGE UP (ref 8.4–10.5)
CHLORIDE SERPL-SCNC: 97 MMOL/L — SIGNIFICANT CHANGE UP (ref 96–108)
CO2 SERPL-SCNC: 22 MMOL/L — SIGNIFICANT CHANGE UP (ref 22–31)
CREAT SERPL-MCNC: 0.97 MG/DL — SIGNIFICANT CHANGE UP (ref 0.5–1.3)
GLUCOSE SERPL-MCNC: 141 MG/DL — HIGH (ref 70–99)
HCT VFR BLD CALC: 31.5 % — LOW (ref 39–50)
HCYS SERPL-MCNC: 45.8 UMOL/L — HIGH (ref 5–20)
HGB BLD-MCNC: 10.4 G/DL — LOW (ref 13–17)
MAGNESIUM SERPL-MCNC: 1.8 MG/DL — SIGNIFICANT CHANGE UP (ref 1.6–2.6)
MCHC RBC-ENTMCNC: 29 PG — SIGNIFICANT CHANGE UP (ref 27–34)
MCHC RBC-ENTMCNC: 33 G/DL — SIGNIFICANT CHANGE UP (ref 32–36)
MCV RBC AUTO: 87.7 FL — SIGNIFICANT CHANGE UP (ref 80–100)
PLATELET # BLD AUTO: 342 K/UL — SIGNIFICANT CHANGE UP (ref 150–400)
POTASSIUM SERPL-MCNC: 3.7 MMOL/L — SIGNIFICANT CHANGE UP (ref 3.5–5.3)
POTASSIUM SERPL-SCNC: 3.7 MMOL/L — SIGNIFICANT CHANGE UP (ref 3.5–5.3)
RBC # BLD: 3.59 M/UL — LOW (ref 4.2–5.8)
RBC # FLD: 15 % — SIGNIFICANT CHANGE UP (ref 10.3–16.9)
SODIUM SERPL-SCNC: 139 MMOL/L — SIGNIFICANT CHANGE UP (ref 135–145)
WBC # BLD: 10 K/UL — SIGNIFICANT CHANGE UP (ref 3.8–10.5)
WBC # FLD AUTO: 10 K/UL — SIGNIFICANT CHANGE UP (ref 3.8–10.5)

## 2018-01-09 PROCEDURE — 72141 MRI NECK SPINE W/O DYE: CPT | Mod: 26

## 2018-01-09 PROCEDURE — 99233 SBSQ HOSP IP/OBS HIGH 50: CPT

## 2018-01-09 PROCEDURE — 99233 SBSQ HOSP IP/OBS HIGH 50: CPT | Mod: GC

## 2018-01-09 RX ORDER — AMOXICILLIN 250 MG/5ML
875 SUSPENSION, RECONSTITUTED, ORAL (ML) ORAL
Qty: 0 | Refills: 0 | Status: DISCONTINUED | OUTPATIENT
Start: 2018-01-09 | End: 2018-01-10

## 2018-01-09 RX ORDER — AMOXICILLIN 250 MG/5ML
875 SUSPENSION, RECONSTITUTED, ORAL (ML) ORAL
Qty: 0 | Refills: 0 | Status: DISCONTINUED | OUTPATIENT
Start: 2018-01-09 | End: 2018-01-09

## 2018-01-09 RX ORDER — POTASSIUM CHLORIDE 20 MEQ
40 PACKET (EA) ORAL ONCE
Qty: 0 | Refills: 0 | Status: COMPLETED | OUTPATIENT
Start: 2018-01-09 | End: 2018-01-09

## 2018-01-09 RX ORDER — LOSARTAN POTASSIUM 100 MG/1
100 TABLET, FILM COATED ORAL DAILY
Qty: 0 | Refills: 0 | Status: DISCONTINUED | OUTPATIENT
Start: 2018-01-09 | End: 2018-01-10

## 2018-01-09 RX ORDER — MAGNESIUM SULFATE 500 MG/ML
1 VIAL (ML) INJECTION ONCE
Qty: 0 | Refills: 0 | Status: COMPLETED | OUTPATIENT
Start: 2018-01-09 | End: 2018-01-09

## 2018-01-09 RX ORDER — THIAMINE MONONITRATE (VIT B1) 100 MG
100 TABLET ORAL DAILY
Qty: 0 | Refills: 0 | Status: DISCONTINUED | OUTPATIENT
Start: 2018-01-09 | End: 2018-01-10

## 2018-01-09 RX ADMIN — HEPARIN SODIUM 5000 UNIT(S): 5000 INJECTION INTRAVENOUS; SUBCUTANEOUS at 13:05

## 2018-01-09 RX ADMIN — Medication 100 MILLIGRAM(S): at 12:10

## 2018-01-09 RX ADMIN — Medication 875 MILLIGRAM(S): at 17:41

## 2018-01-09 RX ADMIN — Medication 40 MILLIEQUIVALENT(S): at 12:41

## 2018-01-09 RX ADMIN — Medication 105 MILLIGRAM(S): at 06:27

## 2018-01-09 RX ADMIN — PREGABALIN 1000 MICROGRAM(S): 225 CAPSULE ORAL at 12:11

## 2018-01-09 RX ADMIN — Medication 25 MILLIGRAM(S): at 06:29

## 2018-01-09 RX ADMIN — ATORVASTATIN CALCIUM 40 MILLIGRAM(S): 80 TABLET, FILM COATED ORAL at 23:17

## 2018-01-09 RX ADMIN — Medication 500 MILLIGRAM(S): at 06:29

## 2018-01-09 RX ADMIN — HEPARIN SODIUM 5000 UNIT(S): 5000 INJECTION INTRAVENOUS; SUBCUTANEOUS at 23:17

## 2018-01-09 RX ADMIN — HEPARIN SODIUM 5000 UNIT(S): 5000 INJECTION INTRAVENOUS; SUBCUTANEOUS at 06:29

## 2018-01-09 RX ADMIN — Medication 1 TABLET(S): at 12:10

## 2018-01-09 RX ADMIN — AMLODIPINE BESYLATE 2.5 MILLIGRAM(S): 2.5 TABLET ORAL at 06:29

## 2018-01-09 RX ADMIN — Medication 1 MILLIGRAM(S): at 12:10

## 2018-01-09 RX ADMIN — Medication 100 GRAM(S): at 12:41

## 2018-01-09 RX ADMIN — Medication 300 MILLIGRAM(S): at 06:29

## 2018-01-09 NOTE — PROGRESS NOTE ADULT - PROBLEM SELECTOR PLAN 4
MRI thoracolumbar performed. Per Neuro partially due to severe multilevel lumbar degenerative disease. Neurosurg (spine) consulted, and per recs a cervical MRI ordered to r/o cord compression  -F/u Neurosurg (spine) recs  -F/u Neuro recs  -PT MRI thoracolumbar performed. Per Neuro partially due to severe multilevel lumbar degenerative disease. Neurosurg (spine) consulted, and per recs a cervical MRI ordered to r/o cord compression  -F/u Neurosurg (spine) recs  -F/u Neuro recs  -F/u cervical MRI  -PT

## 2018-01-09 NOTE — PROGRESS NOTE ADULT - PROBLEM SELECTOR PLAN 1
Resolved. Patient initially septic, meeting 2/4 SIRS (hypothermia, leukocytosis), LA 1.2, with AMS, AOx2 initially. UA positive. Leukocytosis improved. Transitioned from Ceftriaxone to Amoxicillin yesterday based on S/S antibiogram  - Amoxicillin 875 BID, today 1/9 is Day 1 of therapy (as patient remained symptomatic w/ leukocytosis until today) Resolved. Patient initially septic, meeting 2/4 SIRS (hypothermia, leukocytosis), LA 1.2, with AMS, AOx2 initially. UA positive. Leukocytosis improved. Transitioned from Ceftriaxone to Amoxicillin yesterday based on S/S antibiogram. 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 Patient initially septic, meeting 2/4 SIRS (hypothermia, leukocytosis), LA 1.2, with AMS, AOx2 initially. UA positive. Leukocytosis improved. Transitioned from Ceftriaxone to Amoxicillin yesterday based on S/S antibiogram. 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

## 2018-01-09 NOTE — PROGRESS NOTE ADULT - PROBLEM SELECTOR PLAN 2
Patient confused upon arrival to ED. AOx2 on arrival, AOx3 on admission exam -- slightly improved. AOx2-3 this AM which appears to be baseline according to wife (Sissy Vinson, [phone]). Wife states that patient is alcoholic who starts drinking at 10 AM daily, has a baseline confused mental status where she needs to often remind him of things and sometimes translate his speech since others do not always understand him.  Can't remember details, names etc. Reportedly not compliant with treatment. Was at [facility] in October for urologic procedure in which "polyps removed from bladder"  and "peeing blood" and patient did not follow up. Has [facility] hip replacement. No further concerns for withdrawal at this time.  - F/u Neuro recs  - F/u Neurosurg (Spine surgery) recs  - Obtain collateral from Dr. Romero Alvarado (PMD)  - Thiamine  - Folate  - MVI

## 2018-01-09 NOTE — PROGRESS NOTE ADULT - SUBJECTIVE AND OBJECTIVE BOX
INTERVAL HPI/OVERNIGHT EVENTS: Librium d/c'd, patient sleepy this AM and not appear withdrawing. Neurosurgery saw patient, recommending cervical MRI, ordered. Transitioned from Ceftriaxone to Amoxicillin    SUBJECTIVE: Patient seen and examined at bedside. No overnight events. Sleepy this AM and saying he is "not yet conscious", but does not appear to be in pain or distress    ROS:  CV: Denies chest pain  Resp: Denies SOB  GI: Denies abdominal pain, diarrhea, nausea, vomiting  ID: Denies fevers, chills    OBJECTIVE:    VITALS  Vital Signs Last 24 Hrs  T(C): 36.2 (09 Jan 2018 09:28), Max: 36.6 (08 Jan 2018 22:32)  T(F): 97.1 (09 Jan 2018 09:28), Max: 97.9 (08 Jan 2018 22:32)  HR: 79 (09 Jan 2018 09:28) (70 - 79)  BP: 156/79 (09 Jan 2018 09:28) (155/88 - 164/77)  BP(mean): --  RR: 18 (09 Jan 2018 09:28) (18 - 20)  SpO2: 97% (09 Jan 2018 09:28) (95% - 98%)    I&O's Summary    08 Jan 2018 07:01  -  09 Jan 2018 07:00  --------------------------------------------------------  IN: 210 mL / OUT: 0 mL / NET: 210 mL    PHYSICAL EXAM  General: elderly man in NAD sitting up in bed falling asleep intermittently if not prompted by verbal stimulus  Eyes: EOM grossly intact; no scleral icterus  ENMT: MM slightly dry today, no pharyngeal erythema/exudate  Respiratory: CTAB; no W/R/R auscultated; good air movement  Cardiovascular: RRR; S1/S2  Gastrointestinal: Soft; NTND without guarding; bowel sounds present  Extremities: WWP; no edema; radial/pedal pulses palpable  Neurological: AOx3; CNII-XII grossly intact; LEs 2/5 motor b/L w/ L slightly < R; UEs 5/5 b/L; SILT    MEDICATIONS  (STANDING):  allopurinol 100 milliGRAM(s) Oral daily  amLODIPine   Tablet 2.5 milliGRAM(s) Oral daily  amoxicillin    80 mG/mL Suspension 875 milliGRAM(s) Oral two times a day  atorvastatin 40 milliGRAM(s) Oral at bedtime  cyanocobalamin Injectable 1000 MICROGram(s) IntraMuscular daily  diltiazem    milliGRAM(s) Oral daily  folic acid 1 milliGRAM(s) Oral daily  heparin  Injectable 5000 Unit(s) SubCutaneous every 8 hours  multivitamin 1 Tablet(s) Oral daily  thiamine 100 milliGRAM(s) Oral daily    LABS                        10.4   10.0  )-----------( 342      ( 09 Jan 2018 11:12 )             31.5     01-09    139  |  97  |  12  ----------------------------<  141<H>  3.7   |  22  |  0.97    Ca    9.0      09 Jan 2018 11:12  Mg     1.8     01-09    ALLERGIES:  No Known Allergies    RADIOLOGY and Additional Tests reviewed.

## 2018-01-09 NOTE — PROGRESS NOTE ADULT - SUBJECTIVE AND OBJECTIVE BOX
INTERVAL HPI/OVERNIGHT EVENTS: Patient tired but responsive in the AM. More awake and alert in the PM. Reporting lower back pain s/p PT. No other complaints.    VITAL SIGNS:  T(C): 36.4 (01-09-18 @ 15:55), Max: 36.6 (01-08-18 @ 22:32)  T(F): 97.6 (01-09-18 @ 15:55), Max: 97.9 (01-08-18 @ 22:32)  HR: 76 (01-09-18 @ 15:55) (70 - 79)  BP: 147/69 (01-09-18 @ 15:55) (147/69 - 164/77)  BP(mean): --  RR: 18 (01-09-18 @ 15:55) (18 - 20)  SpO2: 98% (01-09-18 @ 15:55) (95% - 98%)  Wt(kg): --    PHYSICAL EXAM:    Constitutional: NAD  Head: NC/AT  Eyes: PERRL, EOMI, anicteric sclera  ENT: no nasal discharge; no oropharyngeal erythema or exudates; MMM  Neck: supple; no JVD or thyromegaly  Respiratory: CTA B/L; no W/R/R  Cardiac: +S1/S2; RRR; no M/R/G  Gastrointestinal: soft, NT/ND; no rebound or guarding; +BSx4  Back: mild lumbar TTP, spine midline, no bony tenderness or step-offs  Extremities: WWP, no clubbing or cyanosis  Musculoskeletal: decreased ROM of b/l LE; no joint swelling, tenderness or erythema  Vascular: 1+ DP pulses B/L  Lymphatic: no submandibular or cervical LAD  Neurologic:  - Mental Status:  AAOx3;   - Cranial Nerves II-XII:    II:  PERRLA  III, IV, VI:  EOMI  V:  Facial sensation is intact in the V1-V3 distribution bilaterally.  VII:  Face is symmetric with normal eye closure and smile  VIII:  Hearing is intact to finger rub.  IX, X:  Uvula is midline and soft palate rises symmetrically  XI:  Head turning and shoulder shrug are intact.  XII:  Tongue protrudes in the midline.  - Motor:  5/5 UE symmetric; LE 3/5 hip flexion, 4+ knee flex/ext, 4+ ankle dorsiflexion  - Reflexes:  2+ UE symmetric; 3+ on LLE, 2+ RLE  - Sensory:  Intact to light touch, pin prick, vibration, and joint-position sense throughout.  - Coordination:  Finger-nose-finger and heel-knee-shin intact without dysmetria.  - Gait: deferred     MEDICATIONS  (STANDING):  allopurinol 100 milliGRAM(s) Oral daily  amLODIPine   Tablet 2.5 milliGRAM(s) Oral daily  amoxicillin    80 mG/mL Suspension 875 milliGRAM(s) Oral two times a day  atorvastatin 40 milliGRAM(s) Oral at bedtime  cyanocobalamin Injectable 1000 MICROGram(s) IntraMuscular daily  folic acid 1 milliGRAM(s) Oral daily  heparin  Injectable 5000 Unit(s) SubCutaneous every 8 hours  losartan 100 milliGRAM(s) Oral daily  multivitamin 1 Tablet(s) Oral daily  thiamine 100 milliGRAM(s) Oral daily    MEDICATIONS  (PRN):      Allergies    No Known Allergies    Intolerances        LABS:                        10.4   10.0  )-----------( 342      ( 09 Jan 2018 11:12 )             31.5     01-09    139  |  97  |  12  ----------------------------<  141<H>  3.7   |  22  |  0.97    Ca    9.0      09 Jan 2018 11:12  Mg     1.8     01-09            RADIOLOGY & ADDITIONAL TESTS:

## 2018-01-09 NOTE — PROGRESS NOTE ADULT - PROBLEM SELECTOR PLAN 3
BP better controlled, today systolic in 150s-160s  - Add home ARB (Olmesartan) PRN HTN  - Unclear why patient on Diltiazem -- considering discontinuation, attempting to get collateral today BP better controlled, today systolic in 150s-160s. Per outpatient PMD Dr. Romero Alvarado Diltiazem given for HTN  - Add home ARB (Olmesartan) PRN HTN  - Unclear why patient on Diltiazem -- considering discontinuation BP better controlled, today systolic in 150s-160s. Per outpatient PMD Dr. Romero Alvarado Diltiazem given for HTN  -Norvasc 2.5mg  - Add home ARB (Olmesartan) PRN HTN  - Unclear why patient on Diltiazem -- considering discontinuation

## 2018-01-09 NOTE — PROGRESS NOTE ADULT - PROBLEM SELECTOR PLAN 6
Patient with recent reported fall at home. Likely in setting of EtOH based on history. Unlikely arrhythmogenic given EKG NSR.  - PT

## 2018-01-09 NOTE — PROGRESS NOTE ADULT - SUBJECTIVE AND OBJECTIVE BOX
Physical Medicine and Rehabilitation Progress Note:    Patient is a 84y old  Male who presents with a chief complaint of pt's doorman called 911 and pt found on his apartment floor in his underwears. pt BIBA to ER (06 Jan 2018 02:44)      HPI:  83 y/o M poor historian with PMHx of HTN, HLD, ?prostate cancer history, ?seizures (pharmacy had keppra as old medication), phimosis of the penis, BIBEMS with AMS x 1 day. Patient lives alone, usually walks slowly with a cane. Staff had not seen patient in a few days and acquaintances have not been able to reach him for a few hours. Patient was found laying on the floor in his apartment, incontinent of feces, unable to explain how he ended up on the floor. No signs of trauma at the scene, unknown LOC. Was last normal two days ago as per superintendent. Patient reports a recent fall two weeks ago where he tripped on his rug. Does not recall hitting his head or losing consciousness. Otherwise denies fever, chills, lightheadedness, CP, palpitations, SOB, cough, URI symptoms, N/V/D/C, abdominal pain, dysuria, hematuria, changes in bowel habits, LE edema. States that he was recently at [facility] for a "blood exchange because of the allopurinol" and "the blood in his sugar." Reports having a wife who lives in a different apartment because "she works from home."    In the ED, VS T 97.2, HR 81, /100, RR 16, O2 96 on RA. Labs notable for leukocytosis to 12.1, Hgb 11.6, BUN/Cr 25/1.13, , trop 0.02. EtOH negative. U tox pending. Given 1L NS bolus x 2 in ED.    PMHx: as above  Surg hx: as above  Meds: allopurinol 100 qd, atorvastatin 40 qhs, dilt  qd, amlodipine 2.5 qd, olmasartan 40mg qd (confirmed with Duane Reade)  All: NKDA  Soc hx: former smoker, smoked 20 years x 2ppd, 3/4 bottle of wine per night. No recreational drugs.  Fhx: non-contributory    Additional information obtained via outpatient [employer] notes:  TTE 8/2017: normal LV size/function, no WMA.   Labs Hgb baseline 12.6  Cr baseline 1.21  Total chol 214 (06 Jan 2018 01:24)                            10.4   10.0  )-----------( 342      ( 09 Jan 2018 11:12 )             31.5       01-09    139  |  97  |  12  ----------------------------<  141<H>  3.7   |  22  |  0.97    Ca    9.0      09 Jan 2018 11:12  Mg     1.8     01-09      Vital Signs Last 24 Hrs  T(C): 36.2 (09 Jan 2018 09:28), Max: 36.6 (08 Jan 2018 22:32)  T(F): 97.1 (09 Jan 2018 09:28), Max: 97.9 (08 Jan 2018 22:32)  HR: 79 (09 Jan 2018 09:28) (70 - 79)  BP: 156/79 (09 Jan 2018 09:28) (155/88 - 164/77)  BP(mean): --  RR: 18 (09 Jan 2018 09:28) (18 - 20)  SpO2: 97% (09 Jan 2018 09:28) (95% - 98%)    MEDICATIONS  (STANDING):  allopurinol 100 milliGRAM(s) Oral daily  amLODIPine   Tablet 2.5 milliGRAM(s) Oral daily  amoxicillin    80 mG/mL Suspension 875 milliGRAM(s) Oral two times a day  atorvastatin 40 milliGRAM(s) Oral at bedtime  cyanocobalamin Injectable 1000 MICROGram(s) IntraMuscular daily  diltiazem    milliGRAM(s) Oral daily  folic acid 1 milliGRAM(s) Oral daily  heparin  Injectable 5000 Unit(s) SubCutaneous every 8 hours  multivitamin 1 Tablet(s) Oral daily  thiamine 100 milliGRAM(s) Oral daily    MEDICATIONS  (PRN):    Currently Undergoing Physical Therapy at bedside.    Initial Functional Status Assessment:    Previous Level of Function:     · Ambulation Skills	independent; needs device; cane	  · Transfer Skills	independent	  · ADL Skills	independent	  · Additional Comments	Pt. reports living in an elevator building, uses SC for ambulation, no formal home care.	    Cognitive Status Examination:   · Orientation	person; place	  · Level of Consciousness	alert; confused	  · Follows Commands and Answers Questions	75% of the time	  · Personal Safety and Judgment	impaired	    Range of Motion Exam:   · Range of Motion Examination	no ROM deficits were identified	    Manual Muscle Testing:   · Manual Muscle Testing Results	bilat UEs >3+/5 by functional assessment against gravity, LEs ~ 3-/5 bilat.	    Bed Mobility: Rolling/Turning:     · Level of [place]	moderate assist (50% patients effort)	  · Physical Assist/Nonphysical Assist	1 person assist; verbal cues	  · Assistive Device	bed rails	    Bed Mobility: Scooting/Bridging:     · Level of [place]	maximum assist (25% patients effort)	  · Physical Assist/Nonphysical Assist	1 person assist	  · Assistive Device	bed rails	    Bed Mobility: Sit to Supine:     · Level of [place]	maximum assist (25% patients effort)	  · Physical Assist/Nonphysical Assist	1 person assist; verbal cues	  · Assistive Device	bed rails	    Bed Mobility: Supine to Sit:     · Level of [place]	maximum assist (25% patients effort)	  · Physical Assist/Nonphysical Assist	1 person assist	  · Assistive Device	bed rails	    Transfer: Sit to Stand:     · Level of [place]	maximum assist (25% patients effort); Pt. was unable to achieve full standing with assist x1.	  · Physical Assist/Nonphysical Assist	1 person assist	  · Weight-Bearing Restrictions	full weight-bearing	  · Assistive Device	rolling walker	    Transfer: Stand to Sit:     · Level of [place]	TBA	    Sit/Stand Transfer Safety Analysis:     · Impairments Contributing to Impaired Transfers	decreased strength; impaired balance	    Gait Skills:     · Level of [place]	TBA	    Stair Negotiation:     · Level of [place]	TBA	    Balance Skills Assessment:     · Sitting Balance: Static	good balance	  · Sitting Balance: Dynamic	fair plus	  · Sit-to-Stand Balance	poor balance	  · Standing Balance: Static	TBA	  · Standing Balance: Dynamic	TBA	  · Systems Impairment Contributing to Balance Disturbance	cognitive; musculoskeletal	  · Identified Impairments Contributing to Balance Disturbance	decreased strength	    Sensory Examination:   Sensory Examination:    Grossly Intact:   · Gross Sensory Examination	Grossly Intact; to light touch throughout	      Clinical Impressions:   · Criteria for Skilled Therapeutic Interventions	impairments found; functional limitations in following categories	  · Impairments Found (describe specific impairments)	aerobic capacity/endurance; gait, locomotion, and balance; arousal, attention, and cognition; muscle strength	  · Functional Limitations in Following Categories (describe specific limitations)	self-care; home management	  · Risk Reduction/Prevention (Describe Specific Areas of risk reduction/prevention)	risk factors	  · Risk Areas	fall	  · Rehab Potential	good, to achieve stated therapy goals	  · Therapy Frequency	3-5x/week	  · Predicted Duration of Therapy Intervention (days/wks)	Pt. would benefit from further PT follow up to improve transfers, bed mobility, strength, balance, assess ambulation.	            PM&R Impression: as above    Disposition Plan Recommendations: subacute rehab placement

## 2018-01-09 NOTE — PROGRESS NOTE ADULT - ATTENDING COMMENTS
Pt seen and examined today.     Leg weakness is stable: 3- L HF, 4+ R HF, distally at least 4+ BL.      Leg weakness likely related to thoracic cord compression, appreciate neurosurg recs, awaiting cervical spine.

## 2018-01-09 NOTE — PROGRESS NOTE ADULT - PROBLEM SELECTOR PLAN 8
F: no IVF  E: Replete electrolytes PRN K > 4, Mg > 2   N: Regular diet    HSQ  Dispo: [facility]  Full Code

## 2018-01-09 NOTE — PROGRESS NOTE ADULT - PROBLEM SELECTOR PLAN 5
Low at 242. Neuro recs starting B12 supplementation as per below  -Continue 1000mcg IM daily x 5 days (day 1 is 1/7) then weekly x 4, then 1000mcg PO daily.  -Switch to weekly B12 on 1/12  -F/u MMA, Folate, Homocysteine, Vit-D 25  -F/u Neuro recs

## 2018-01-09 NOTE — CHART NOTE - NSCHARTNOTEFT_GEN_A_CORE
84M PMH EtOH, HTN, HLD, papillary bladder carcinoma, ?seizures (pharmacy had keppra as old medication), phimosiss BIBEMS with AMS x 1 day. Patient lives alone, usually walks slowly with a cane. Staff had not seen patient in a few days and acquaintances have not been able to reach him for a few hours. Patient was found laying on the floor in his apartment, incontinent of feces, unable to explain how he ended up on the floor. In ED VS T 97.2, HR 81, /100, RR 16, O2 96 on RA. Labs notable for leukocytosis to 12.1, Hgb 11.6, BUN/Cr 25/1.13, , trop 0.02. EtOH negative. U tox pending. Given 1L NS bolus x 2 in ED. Abbott was placed. Admitted to [facility]. Ceftriaxone continued for UTI, given thiamine, folate and MVI. CIWA scores remained < 8. Abbott removed w/ successful TOV. Neuro consulted for profound b/L LE weakness (sensation intact), and thoracolumbar MRIs ordered per Neuro recs, showing findings of degenerative disk disease, disc bulges and myelopathic changes. B12 started. Patient briefly agitated and started on Librium 25q8h standing w/ concern for withdrawal, but order stopped next AM. Neurosurgery consulted (spine), and MRI cervical ordered per recs to r/o cord compression. Antibiotics changed from Ceftriaxone to Amoxicillin (Day 1 of Abx is 1/9).

## 2018-01-10 ENCOUNTER — TRANSCRIPTION ENCOUNTER (OUTPATIENT)
Age: 83
End: 2018-01-10

## 2018-01-10 VITALS
OXYGEN SATURATION: 97 % | TEMPERATURE: 98 F | SYSTOLIC BLOOD PRESSURE: 155 MMHG | HEART RATE: 74 BPM | RESPIRATION RATE: 16 BRPM | DIASTOLIC BLOOD PRESSURE: 88 MMHG

## 2018-01-10 LAB
ANION GAP SERPL CALC-SCNC: 15 MMOL/L — SIGNIFICANT CHANGE UP (ref 5–17)
BUN SERPL-MCNC: 11 MG/DL — SIGNIFICANT CHANGE UP (ref 7–23)
CALCIUM SERPL-MCNC: 8.9 MG/DL — SIGNIFICANT CHANGE UP (ref 8.4–10.5)
CHLORIDE SERPL-SCNC: 99 MMOL/L — SIGNIFICANT CHANGE UP (ref 96–108)
CO2 SERPL-SCNC: 21 MMOL/L — LOW (ref 22–31)
CREAT SERPL-MCNC: 0.93 MG/DL — SIGNIFICANT CHANGE UP (ref 0.5–1.3)
GLUCOSE SERPL-MCNC: 118 MG/DL — HIGH (ref 70–99)
HCT VFR BLD CALC: 33.3 % — LOW (ref 39–50)
HGB BLD-MCNC: 10.7 G/DL — LOW (ref 13–17)
MAGNESIUM SERPL-MCNC: 2.1 MG/DL — SIGNIFICANT CHANGE UP (ref 1.6–2.6)
MCHC RBC-ENTMCNC: 28.4 PG — SIGNIFICANT CHANGE UP (ref 27–34)
MCHC RBC-ENTMCNC: 32.1 G/DL — SIGNIFICANT CHANGE UP (ref 32–36)
MCV RBC AUTO: 88.3 FL — SIGNIFICANT CHANGE UP (ref 80–100)
METHYLMALONATE SERPL-SCNC: 206 NMOL/L — SIGNIFICANT CHANGE UP (ref 0–378)
PLATELET # BLD AUTO: 340 K/UL — SIGNIFICANT CHANGE UP (ref 150–400)
POTASSIUM SERPL-MCNC: 4.2 MMOL/L — SIGNIFICANT CHANGE UP (ref 3.5–5.3)
POTASSIUM SERPL-SCNC: 4.2 MMOL/L — SIGNIFICANT CHANGE UP (ref 3.5–5.3)
RBC # BLD: 3.77 M/UL — LOW (ref 4.2–5.8)
RBC # FLD: 14.7 % — SIGNIFICANT CHANGE UP (ref 10.3–16.9)
SODIUM SERPL-SCNC: 135 MMOL/L — SIGNIFICANT CHANGE UP (ref 135–145)
WBC # BLD: 9.9 K/UL — SIGNIFICANT CHANGE UP (ref 3.8–10.5)
WBC # FLD AUTO: 9.9 K/UL — SIGNIFICANT CHANGE UP (ref 3.8–10.5)

## 2018-01-10 PROCEDURE — 85027 COMPLETE CBC AUTOMATED: CPT

## 2018-01-10 PROCEDURE — 73110 X-RAY EXAM OF WRIST: CPT

## 2018-01-10 PROCEDURE — 97161 PT EVAL LOW COMPLEX 20 MIN: CPT

## 2018-01-10 PROCEDURE — 84484 ASSAY OF TROPONIN QUANT: CPT

## 2018-01-10 PROCEDURE — 82607 VITAMIN B-12: CPT

## 2018-01-10 PROCEDURE — 84295 ASSAY OF SERUM SODIUM: CPT

## 2018-01-10 PROCEDURE — 83605 ASSAY OF LACTIC ACID: CPT

## 2018-01-10 PROCEDURE — 84132 ASSAY OF SERUM POTASSIUM: CPT

## 2018-01-10 PROCEDURE — 85025 COMPLETE CBC W/AUTO DIFF WBC: CPT

## 2018-01-10 PROCEDURE — 82746 ASSAY OF FOLIC ACID SERUM: CPT

## 2018-01-10 PROCEDURE — 82330 ASSAY OF CALCIUM: CPT

## 2018-01-10 PROCEDURE — 97116 GAIT TRAINING THERAPY: CPT

## 2018-01-10 PROCEDURE — 74177 CT ABD & PELVIS W/CONTRAST: CPT

## 2018-01-10 PROCEDURE — 93005 ELECTROCARDIOGRAM TRACING: CPT | Mod: 77

## 2018-01-10 PROCEDURE — 80307 DRUG TEST PRSMV CHEM ANLYZR: CPT

## 2018-01-10 PROCEDURE — 72141 MRI NECK SPINE W/O DYE: CPT

## 2018-01-10 PROCEDURE — 80053 COMPREHEN METABOLIC PANEL: CPT

## 2018-01-10 PROCEDURE — 87086 URINE CULTURE/COLONY COUNT: CPT

## 2018-01-10 PROCEDURE — 84443 ASSAY THYROID STIM HORMONE: CPT

## 2018-01-10 PROCEDURE — 87186 SC STD MICRODIL/AGAR DIL: CPT

## 2018-01-10 PROCEDURE — 82306 VITAMIN D 25 HYDROXY: CPT

## 2018-01-10 PROCEDURE — 87040 BLOOD CULTURE FOR BACTERIA: CPT

## 2018-01-10 PROCEDURE — 82550 ASSAY OF CK (CPK): CPT

## 2018-01-10 PROCEDURE — 99239 HOSP IP/OBS DSCHRG MGMT >30: CPT

## 2018-01-10 PROCEDURE — 80048 BASIC METABOLIC PNL TOTAL CA: CPT

## 2018-01-10 PROCEDURE — 71045 X-RAY EXAM CHEST 1 VIEW: CPT

## 2018-01-10 PROCEDURE — 83921 ORGANIC ACID SINGLE QUANT: CPT

## 2018-01-10 PROCEDURE — 95819 EEG AWAKE AND ASLEEP: CPT

## 2018-01-10 PROCEDURE — 82553 CREATINE MB FRACTION: CPT

## 2018-01-10 PROCEDURE — 99285 EMERGENCY DEPT VISIT HI MDM: CPT | Mod: 25

## 2018-01-10 PROCEDURE — 70450 CT HEAD/BRAIN W/O DYE: CPT

## 2018-01-10 PROCEDURE — 83090 ASSAY OF HOMOCYSTEINE: CPT

## 2018-01-10 PROCEDURE — 71260 CT THORAX DX C+: CPT

## 2018-01-10 PROCEDURE — 81001 URINALYSIS AUTO W/SCOPE: CPT

## 2018-01-10 PROCEDURE — 83735 ASSAY OF MAGNESIUM: CPT

## 2018-01-10 PROCEDURE — 86780 TREPONEMA PALLIDUM: CPT

## 2018-01-10 PROCEDURE — 99232 SBSQ HOSP IP/OBS MODERATE 35: CPT

## 2018-01-10 PROCEDURE — 90715 TDAP VACCINE 7 YRS/> IM: CPT

## 2018-01-10 PROCEDURE — 72146 MRI CHEST SPINE W/O DYE: CPT

## 2018-01-10 PROCEDURE — 73502 X-RAY EXAM HIP UNI 2-3 VIEWS: CPT

## 2018-01-10 PROCEDURE — 72148 MRI LUMBAR SPINE W/O DYE: CPT

## 2018-01-10 PROCEDURE — 82803 BLOOD GASES ANY COMBINATION: CPT

## 2018-01-10 PROCEDURE — 36415 COLL VENOUS BLD VENIPUNCTURE: CPT

## 2018-01-10 RX ORDER — THIAMINE MONONITRATE (VIT B1) 100 MG
1 TABLET ORAL
Qty: 0 | Refills: 0 | COMMUNITY
Start: 2018-01-10

## 2018-01-10 RX ORDER — AMOXICILLIN 250 MG/5ML
0 SUSPENSION, RECONSTITUTED, ORAL (ML) ORAL
Qty: 0 | Refills: 0 | COMMUNITY
Start: 2018-01-10

## 2018-01-10 RX ORDER — PREGABALIN 225 MG/1
1000 CAPSULE ORAL
Qty: 1 | Refills: 0 | OUTPATIENT
Start: 2018-01-10 | End: 2018-01-10

## 2018-01-10 RX ORDER — ACETAMINOPHEN 500 MG
975 TABLET ORAL ONCE
Qty: 0 | Refills: 0 | Status: COMPLETED | OUTPATIENT
Start: 2018-01-10 | End: 2018-01-10

## 2018-01-10 RX ORDER — DILTIAZEM HCL 120 MG
1 CAPSULE, EXT RELEASE 24 HR ORAL
Qty: 0 | Refills: 0 | COMMUNITY

## 2018-01-10 RX ORDER — FOLIC ACID 0.8 MG
1 TABLET ORAL
Qty: 0 | Refills: 0 | COMMUNITY
Start: 2018-01-10

## 2018-01-10 RX ADMIN — Medication 100 MILLIGRAM(S): at 12:50

## 2018-01-10 RX ADMIN — PREGABALIN 1000 MICROGRAM(S): 225 CAPSULE ORAL at 12:50

## 2018-01-10 RX ADMIN — LOSARTAN POTASSIUM 100 MILLIGRAM(S): 100 TABLET, FILM COATED ORAL at 06:31

## 2018-01-10 RX ADMIN — AMLODIPINE BESYLATE 2.5 MILLIGRAM(S): 2.5 TABLET ORAL at 06:31

## 2018-01-10 RX ADMIN — HEPARIN SODIUM 5000 UNIT(S): 5000 INJECTION INTRAVENOUS; SUBCUTANEOUS at 13:56

## 2018-01-10 RX ADMIN — HEPARIN SODIUM 5000 UNIT(S): 5000 INJECTION INTRAVENOUS; SUBCUTANEOUS at 07:10

## 2018-01-10 RX ADMIN — Medication 975 MILLIGRAM(S): at 07:21

## 2018-01-10 RX ADMIN — Medication 1 MILLIGRAM(S): at 12:50

## 2018-01-10 RX ADMIN — Medication 1 TABLET(S): at 12:50

## 2018-01-10 RX ADMIN — Medication 875 MILLIGRAM(S): at 06:33

## 2018-01-10 NOTE — DISCHARGE NOTE ADULT - HOSPITAL COURSE
84M PMH EtOH, HTN, HLD, papillary bladder carcinoma, ?seizures (pharmacy had keppra as old medication), phimosiss BIBEMS with AMS x 1 day. Patient lives alone, usually walks slowly with a cane. Staff had not seen patient in a few days and acquaintances have not been able to reach him for a few hours. Patient was found laying on the floor in his apartment, incontinent of feces, unable to explain how he ended up on the floor. In ED VS T 97.2, HR 81, /100, RR 16, O2 96 on RA. Labs notable for leukocytosis to 12.1, Hgb 11.6, BUN/Cr 25/1.13, , trop 0.02. EtOH negative. U tox pending. Given 1L NS bolus x 2 in ED. Abbott was placed. Admitted to [facility]. Ceftriaxone continued for UTI, given thiamine, folate and MVI. CIWA scores remained < 8. Abbott removed w/ successful TOV. Neuro consulted for profound b/L LE weakness (sensation intact), and thoracolumbar MRIs ordered per Neuro recs, showing findings of degenerative disk disease, disc bulges and myelopathic changes. B12 started. Patient briefly agitated and started on Librium 25q8h standing w/ concern for withdrawal, but order stopped next AM. Neurosurgery consulted (spine), and MRI cervical ordered per recs to r/o cord compression. Cervical MRI with mild cervical stenosis, per neurosurgery no acute intervention, recommending physical therapy. Antibiotics changed from Ceftriaxone to Amoxicillin (Day 1 of Abx is 1/9).

## 2018-01-10 NOTE — PROGRESS NOTE ADULT - PROBLEM SELECTOR PLAN 1
Resolved. Patient initially septic, meeting 2/4 SIRS (hypothermia, leukocytosis), LA 1.2, with AMS, AOx2 initially. UA positive. Leukocytosis improved. Transitioned from Ceftriaxone to Amoxicillin yesterday based on S/S antibiogram. Per PMD Romero Alvarado, patient had papillary tumor of bladder that was removed at [facility]  - Amoxicillin 875 BID, today 1/9 is Day 1 of therapy (as patient remained symptomatic w/ leukocytosis until today), to end 1/13 Resolved. Patient initially septic, meeting 2/4 SIRS (hypothermia, leukocytosis), LA 1.2, with AMS, AOx2 initially. UA positive. Leukocytosis improved. Transitioned from Ceftriaxone to Amoxicillin 1/8 based on S/S antibiogram. Per PMD Romero Alvarado, patient had papillary tumor of bladder that was removed at [facility] [place]  - Amoxicillin 875 BID, today 1/10 is Day 2 of therapy (as patient remained symptomatic w/ leukocytosis until 1/8) to end 1/13

## 2018-01-10 NOTE — PROGRESS NOTE ADULT - SUBJECTIVE AND OBJECTIVE BOX
INTERVAL HPI/OVERNIGHT EVENTS:    VITAL SIGNS:  T(C): 36.4 (01-10-18 @ 09:24), Max: 37.2 (01-10-18 @ 03:30)  T(F): 97.6 (01-10-18 @ 09:24), Max: 98.9 (01-10-18 @ 03:30)  HR: 74 (01-10-18 @ 09:24) (74 - 80)  BP: 155/88 (01-10-18 @ 09:24) (133/61 - 160/89)  BP(mean): --  RR: 16 (01-10-18 @ 09:24) (16 - 18)  SpO2: 97% (01-10-18 @ 09:24) (97% - 99%)  Wt(kg): --    PHYSICAL EXAM:    Constitutional: WDWN resting comfortably in bed; NAD  Head: NC/AT  Eyes: PERRL, EOMI, anicteric sclera  ENT: no nasal discharge; uvula midline, no oropharyngeal erythema or exudates; MMM  Neck: supple; no JVD or thyromegaly  Respiratory: CTA B/L; no W/R/R, no retractions  Cardiac: +S1/S2; RRR; no M/R/G; PMI non-displaced  Gastrointestinal: soft, NT/ND; no rebound or guarding; +BSx4  Genitourinary: normal external genitalia  Back: spine midline, no bony tenderness or step-offs; no CVAT B/L  Extremities: WWP, no clubbing or cyanosis; no peripheral edema  Musculoskeletal: NROM x4; no joint swelling, tenderness or erythema  Vascular: 2+ radial, femoral, DP/PT pulses B/L  Dermatologic: skin warm, dry and intact; no rashes, wounds, or scars  Lymphatic: no submandibular or cervical LAD  Neurologic: AAOx3; CNII-XII grossly intact; no focal deficits  Psychiatric: affect and characteristics of appearance, verbalizations, behaviors are appropriate    MEDICATIONS  (STANDING):  allopurinol 100 milliGRAM(s) Oral daily  amLODIPine   Tablet 2.5 milliGRAM(s) Oral daily  amoxicillin    80 mG/mL Suspension 875 milliGRAM(s) Oral two times a day  atorvastatin 40 milliGRAM(s) Oral at bedtime  cyanocobalamin Injectable 1000 MICROGram(s) IntraMuscular daily  folic acid 1 milliGRAM(s) Oral daily  heparin  Injectable 5000 Unit(s) SubCutaneous every 8 hours  losartan 100 milliGRAM(s) Oral daily  multivitamin 1 Tablet(s) Oral daily  thiamine 100 milliGRAM(s) Oral daily    MEDICATIONS  (PRN):      Allergies    No Known Allergies    Intolerances        LABS:                        10.7   9.9   )-----------( 340      ( 10 Dano 2018 06:48 )             33.3     01-10    135  |  99  |  11  ----------------------------<  118<H>  4.2   |  21<L>  |  0.93    Ca    8.9      10 Dano 2018 06:48  Mg     2.1     01-10            RADIOLOGY & ADDITIONAL TESTS: INTERVAL HPI/OVERNIGHT EVENTS: patient reports L hip pain; ROS otherwise negative.     VITAL SIGNS:  T(C): 36.4 (01-10-18 @ 09:24), Max: 37.2 (01-10-18 @ 03:30)  T(F): 97.6 (01-10-18 @ 09:24), Max: 98.9 (01-10-18 @ 03:30)  HR: 74 (01-10-18 @ 09:24) (74 - 80)  BP: 155/88 (01-10-18 @ 09:24) (133/61 - 160/89)  BP(mean): --  RR: 16 (01-10-18 @ 09:24) (16 - 18)  SpO2: 97% (01-10-18 @ 09:24) (97% - 99%)  Wt(kg): --    PHYSICAL EXAM:    Constitutional: NAD  Head: NC/AT  Eyes: PERRL, EOMI, anicteric sclera  ENT: no nasal discharge; no oropharyngeal erythema or exudates; MMM  Neck: supple; no JVD or thyromegaly  Respiratory: CTA B/L; no W/R/R  Cardiac: +S1/S2; RRR; no M/R/G  Gastrointestinal: soft, NT/ND; no rebound or guarding; +BSx4  Back: mild lumbar TTP, spine midline, no bony tenderness or step-offs  Extremities: WWP, no clubbing or cyanosis  Musculoskeletal: decreased ROM of b/l LE; no joint swelling, tenderness or erythema  Vascular: 1+ DP pulses B/L  Lymphatic: no submandibular or cervical LAD  Neurologic:  - Mental Status:  AAOx3;   - Cranial Nerves II-XII:    II:  PERRLA  III, IV, VI:  EOMI  V:  Facial sensation is intact in the V1-V3 distribution bilaterally.  VII:  Face is symmetric with normal eye closure and smile  VIII:  Hearing is intact to finger rub.  IX, X:  Uvula is midline and soft palate rises symmetrically  XI:  Head turning and shoulder shrug are intact.  XII:  Tongue protrudes in the midline.  - Motor:  5/5 UE symmetric; LE 3/5 hip flexion, 4+ knee flex/ext, 4+ ankle dorsiflexion  - Reflexes:  2+ UE symmetric; 3+ on LLE, 2+ RLE  - Sensory:  Intact to light touch, pin prick, vibration, and joint-position sense throughout.  - Coordination:  Finger-nose-finger and heel-knee-shin intact without dysmetria.  - Gait: deferred     MEDICATIONS  (STANDING):  allopurinol 100 milliGRAM(s) Oral daily  amLODIPine   Tablet 2.5 milliGRAM(s) Oral daily  amoxicillin    80 mG/mL Suspension 875 milliGRAM(s) Oral two times a day  atorvastatin 40 milliGRAM(s) Oral at bedtime  cyanocobalamin Injectable 1000 MICROGram(s) IntraMuscular daily  folic acid 1 milliGRAM(s) Oral daily  heparin  Injectable 5000 Unit(s) SubCutaneous every 8 hours  losartan 100 milliGRAM(s) Oral daily  multivitamin 1 Tablet(s) Oral daily  thiamine 100 milliGRAM(s) Oral daily    MEDICATIONS  (PRN):      Allergies    No Known Allergies    Intolerances        LABS:                        10.7   9.9   )-----------( 340      ( 10 Dano 2018 06:48 )             33.3     01-10    135  |  99  |  11  ----------------------------<  118<H>  4.2   |  21<L>  |  0.93    Ca    8.9      10 Dano 2018 06:48  Mg     2.1     01-10            RADIOLOGY & ADDITIONAL TESTS:    EXAM:  MR SPINE CERVICAL                          PROCEDURE DATE:  01/09/2018      IMPRESSION:    1. Multilevel degenerative disc disease with disc-osteophyte complex at all levels of the cervical spine with abroad-based central disc protrusion C4-C5 level.  2. Central canal stenosis C4-C5 and C5-C6 levels which relates to acquired etiologies from osteoarthritis and disc disease.  3. Multilevel degenerative osteoarthritis and multilevel foraminal narrowing, as above.  4. 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

## 2018-01-10 NOTE — PROGRESS NOTE ADULT - PROBLEM SELECTOR PLAN 5
Low at 242. Neuro recs starting B12 supplementation as per below  -Continue 1000mcg IM daily x 5 days (day 1 is 1/7) then weekly x 4, then 1000mcg PO daily.  -Switch to weekly B12 on 1/12  -F/u MMA, Folate, Homocysteine, Vit-D 25  -F/u Neuro recs Low at 242. Neuro recs starting B12 supplementation as per below  -Continue 1000mcg IM daily x 5 days (day 1 is 1/7) then weekly x 4, then 1000mcg PO daily  -Switch to weekly B12 on 1/12  -F/u MMA, Folate low 4.4, Homocysteine high 45.8, Vit-D 25 (38.8)  -F/u Neuro recs

## 2018-01-10 NOTE — PROGRESS NOTE ADULT - SUBJECTIVE AND OBJECTIVE BOX
Cervical spine imaging reviewed with Dr. Rincon.  Degree of cervical spine stenosis does not correlate with patient's myelopathy.  No neurosurgical interventions at this time.  Recommendations at this time include physical therapy evaluation.  Reconsult neurosurgery as needed.  Case, imaging, and plan discussed with Dr. Loki Rincon

## 2018-01-10 NOTE — DISCHARGE NOTE ADULT - ADDITIONAL INSTRUCTIONS
Please follow up with Dr. Loki Rincon within 1-2 weeks.  His contact information is provided below.   Please follow up with your primary care physician within 1-2 weeks.

## 2018-01-10 NOTE — PROGRESS NOTE ADULT - ASSESSMENT
Problem/Plan - 5:  ·  Problem: B12 deficiency.  Plan: Low at 242. Neuro recs starting B12 supplementation as per below  -Continue 1000mcg IM daily x 5 days (day 1 is 1/7) then weekly x 4, then 1000mcg PO daily.  -Switch to weekly B12 on 1/12  -F/u MMA, Folate, Homocysteine, Vit-D 25  -F/u Neuro recs. 84M poor historian with PMHx EtOH, HTN, HLD, ?prostate cancer history, phimosis of the penis being treated for UTI and worked up for b/L LE weakness.    Problem/Plan - 1:  ·  Problem: UTI (urinary tract infection).  Plan: Resolved. Patient initially septic, meeting 2/4 SIRS (hypothermia, leukocytosis), LA 1.2, with AMS, AOx2 initially. UA positive. Leukocytosis improved. Transitioned from Ceftriaxone to Amoxicillin yesterday based on S/S antibiogram. 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    Problem/Plan - 2:  ·  Problem: Alcohol abuse.  Plan: Patient confused upon arrival to ED. AOx2 on arrival, AOx3 on admission exam -- slightly improved. AOx2-3 this AM which appears to be baseline according to wife (Sissy Vinson, [phone]). Wife states that patient is alcoholic who starts drinking at 10 AM daily, has a baseline confused mental status where she needs to often remind him of things and sometimes translate his speech since others do not always understand him.  Can't remember details, names etc. Reportedly not compliant with treatment. Was at [facility] in October for urologic procedure in which "polyps removed from bladder"  and "peeing blood" and patient did not follow up. Has [facility] hip replacement. No further concerns for withdrawal at this time.  - F/u Neuro recs  - F/u Neurosurg (Spine surgery) recs  - Obtain collateral from Dr. Romero Alvarado (PMD)  - Thiamine  - Folate  - MVI.     Problem/Plan - 3:  ·  Problem: Hypertensive crisis.  Plan: BP better controlled, today systolic in 150s-160s. Per outpatient PMD Dr. Romero Alvarado Diltiazem given for HTN  - Add home ARB (Olmesartan) PRN HTN  - Unclear why patient on Diltiazem -- considering discontinuation.    Problem/Plan - 4:  ·  Problem: Leg weakness.  Plan: MRI thoracolumbar performed. Per Neuro partially due to severe multilevel lumbar degenerative disease. Neurosurg (spine) consulted, and per recs a cervical MRI ordered to r/o cord compression  -F/u Neurosurg (spine) recs  -F/u Neuro recs  -F/u cervical MRI
Patient is an 83yo M poor historian with PMH EtOH abuse, HTN, HLD, ?prostate cancer hx, phimosis currently being managed for UTI and worked up for b/L LE weakness.    #LE weakness: MRI showing disk bulges and degeneration with some impingement of the spinal cord. Leg weakness likely chronic, with acute component in setting of UTI; neurosx following  -c/w B12 1000mcg IM daily x 5 days then weekly x 4, then 1000mcg PO daily.  -f/u cervical MRI  -f/u neurosx recs    #Dementia: as per wife, patient with progressive dementia and worsened by ETOH abuse; pt sleepy in the AM, likely 2/2 librium, currently more awake in the PM; currently exhibiting no s/s of EtOH withdrawal or agitation  -c/w thiamine. No other neurological intervention at this time.   -hold benzos unless patient exhibiting s/s of EtOH withdrawal
84M poor historian with PMHx EtOH, HTN, HLD, ?prostate cancer history, phimosis of the penis being treated for UTI and worked up for b/L LE weakness.
84M poor historian with PMHx EtOH, HTN, HLD, ?prostate cancer history, phimosis of the penis being treated for UTI and worked up for b/L LE weakness.
Leg weakness likely partially due to severe lumbar degenerative disease. Given this is due to multilevel disease and not one particular area of compression, surgical decompression would be need to be extensive and higher risk than single level laminectomy. Consider spine consult, but per my conversation with him patient not interested in surgery at this time.     B12 level is low at 242. Would check folate, methylmalonic acid, homocysteine, vitamin D-25. Start 1000mcg IM daily x 5 days then weekly x 4, then 1000mcg PO daily.    Recommend PT evaluation, acute vs. subacute rehab,
83 y/o M poor historian with PMHx of HTN, HLD, ?prostate cancer history, phimosis of the penis, presents to ED with AMS.
83 y/o M poor historian with PMHx of HTN, HLD, ?prostate cancer history, phimosis of the penis, presents to ED with AMS.
Patient is an 83yo M poor historian with PMH EtOH abuse, HTN, HLD, ?prostate cancer hx, phimosis currently being managed for UTI and worked up for b/L LE weakness.    #LE weakness: MRI showing disk bulges and degeneration with some impingement of the spinal cord. Leg weakness likely chronic, with acute component in setting of UTI; cervical CT w/ cervical stenosis; as per neurosx, no surgical intervention indicated at this time  -c/w B12 1000mcg IM daily x 5 days then weekly x 4, then 1000mcg PO daily    #Dementia: as per wife, patient with progressive dementia and worsened by ETOH abuse; pt alert and AAOx3 today; mentating well, conversant, and responding appropriately to questions  -c/w thiamine. No other neurological intervention at this time.   -hold benzos unless patient exhibiting s/s of EtOH withdrawal

## 2018-01-10 NOTE — DISCHARGE NOTE ADULT - PATIENT PORTAL LINK FT
“You can access the [facility] Patient Portal, offered by [facility], by registering with the following website: [email or web address]”

## 2018-01-10 NOTE — PROGRESS NOTE ADULT - PROVIDER SPECIALTY LIST ADULT
Internal Medicine
Neurology
Neurosurgery
Urology
Rehab Medicine
Neurology
Internal Medicine

## 2018-01-10 NOTE — DISCHARGE NOTE ADULT - REASON FOR ADMISSION
pt's doorman called 911 and pt found on his apartment floor in his underwears. pt BIBA to ER pt's doorman called 911 and pt found on his apartment floor in his underwear. pt BIBA to ER

## 2018-01-10 NOTE — DISCHARGE NOTE ADULT - MEDICATION SUMMARY - MEDICATIONS TO TAKE
I will START or STAY ON the medications listed below when I get home from the hospital:    olmesartan 40 mg oral tablet  -- 1 tab(s) by mouth once a day  -- Indication: For HTN (hypertension)    allopurinol 100 mg oral tablet  -- 1 tab(s) by mouth once a day  -- Indication: For Leg weakness    atorvastatin 40 mg oral tablet  -- 1 tab(s) by mouth once a day  -- Indication: For HLD (hyperlipidemia)    amLODIPine 2.5 mg oral tablet  -- 1 tab(s) by mouth once a day  -- Indication: For HTN (hypertension)    amoxicillin 400 mg/5 mL oral liquid  --  by mouth   -- Indication: For Complicated UTI (urinary tract infection)    Multiple Vitamins oral tablet  -- 1 tab(s) by mouth once a day  -- Indication: For Nutrition, metabolism, and development symptoms    thiamine 100 mg oral tablet  -- 1 tab(s) by mouth once a day  -- Indication: For Nutrition, metabolism, and development symptoms    folic acid 1 mg oral tablet  -- 1 tab(s) by mouth once a day  -- Indication: For Nutrition, metabolism, and development symptoms I will START or STAY ON the medications listed below when I get home from the hospital:    olmesartan 40 mg oral tablet  -- 1 tab(s) by mouth once a day  -- Indication: For HTN (hypertension)    allopurinol 100 mg oral tablet  -- 1 tab(s) by mouth once a day  -- Indication: For Leg weakness    atorvastatin 40 mg oral tablet  -- 1 tab(s) by mouth once a day  -- Indication: For HLD (hyperlipidemia)    amLODIPine 2.5 mg oral tablet  -- 1 tab(s) by mouth once a day  -- Indication: For HTN (hypertension)    amoxicillin 400 mg/5 mL oral liquid  --  by mouth - please take 875 mg twice daily.  -- Indication: For Complicated UTI (urinary tract infection)    Multiple Vitamins oral tablet  -- 1 tab(s) by mouth once a day  -- Indication: For Nutrition, metabolism, and development symptoms    thiamine 100 mg oral tablet  -- 1 tab(s) by mouth once a day  -- Indication: For Nutrition, metabolism, and development symptoms    folic acid 1 mg oral tablet  -- 1 tab(s) by mouth once a day  -- Indication: For Nutrition, metabolism, and development symptoms

## 2018-01-10 NOTE — PROGRESS NOTE ADULT - PROBLEM SELECTOR PLAN 4
MRI thoracolumbar performed. Per Neuro partially due to severe multilevel lumbar degenerative disease. Neurosurg (spine) consulted, and per recs a cervical MRI ordered to r/o cord compression  -F/u Neurosurg (spine) recs  -F/u Neuro recs  -F/u cervical MRI  -PT -MRI thoracolumbar performed. Per Neuro partially due to severe multilevel lumbar degenerative disease.  -Cervical MRI performed- per neurosurgery, not impressive with mild cervical stenosis, no acute concerns. Recommending physical therapy and outpatient follow-up with Dr. Rincon.  -f/u neuro recs

## 2018-01-10 NOTE — PROGRESS NOTE ADULT - PROBLEM SELECTOR PROBLEM 2
Complicated UTI (urinary tract infection)
Alcohol abuse
Alcohol abuse
UTI (urinary tract infection)
Sepsis

## 2018-01-10 NOTE — PROGRESS NOTE ADULT - PROBLEM SELECTOR PLAN 2
Patient confused upon arrival to ED. AOx2 on arrival, AOx3 on admission exam -- slightly improved. AOx2-3 this AM which appears to be baseline according to wife (Sissy Vinson, [phone]). Wife states that patient is alcoholic who starts drinking at 10 AM daily, has a baseline confused mental status where she needs to often remind him of things and sometimes translate his speech since others do not always understand him.  Can't remember details, names etc. Reportedly not compliant with treatment. Was at [facility] in October for urologic procedure in which "polyps removed from bladder"  and "peeing blood" and patient did not follow up. Has [facility] hip replacement. No further concerns for withdrawal at this time.  - F/u Neuro recs  - F/u Neurosurg (Spine surgery) recs  - Obtain collateral from Dr. Romero Alvarado (PMD)  - Thiamine  - Folate  - MVI

## 2018-01-10 NOTE — DISCHARGE NOTE ADULT - MEDICATION SUMMARY - MEDICATIONS TO STOP TAKING
I will STOP taking the medications listed below when I get home from the hospital:    DilTIAZem Hydrochloride  mg/24 hours oral capsule, extended release  -- 1 cap(s) by mouth once a day

## 2018-01-10 NOTE — PROGRESS NOTE ADULT - ATTENDING COMMENTS
Pt seen and examined today.     Leg weakness is stable: 3- L HF, 4+ R HF, distally at least 4+ BL.      Leg weakness likely related to thoracic cord compression, appreciate neurosurg recs.

## 2018-01-10 NOTE — PROGRESS NOTE ADULT - SUBJECTIVE AND OBJECTIVE BOX
INTERVAL HPI/OVERNIGHT EVENTS:    SUBJECTIVE: Patient seen and examined at bedside.     ROS:  CV: Denies chest pain  Resp: Denies SOB  GI: Denies abdominal pain, constipation, diarrhea, nausea, vomiting  : Denies dysuria  ID: Denies fevers, chills  MSK: Denies joint pain     OBJECTIVE:    VITAL SIGNS:  ICU Vital Signs Last 24 Hrs  T(C): 36.6 (10 Dano 2018 04:57), Max: 37.2 (10 Dano 2018 03:30)  T(F): 97.9 (10 Dano 2018 04:57), Max: 98.9 (10 Dano 2018 03:30)  HR: 79 (10 Dano 2018 04:57) (76 - 80)  BP: 160/89 (10 Dano 2018 04:57) (133/61 - 160/89)  BP(mean): --  ABP: --  ABP(mean): --  RR: 17 (10 Dano 2018 04:57) (16 - 18)  SpO2: 99% (10 Dano 2018 04:57) (97% - 99%)        CAPILLARY BLOOD GLUCOSE          PHYSICAL EXAM:  General: elderly man in NAD sitting up in bed falling asleep intermittently if not prompted by verbal stimulus  Eyes: EOM grossly intact; no scleral icterus  ENMT: MM slightly dry today, no pharyngeal erythema/exudate  Respiratory: CTAB; no W/R/R auscultated; good air movement  Cardiovascular: RRR; S1/S2  Gastrointestinal: Soft; NTND without guarding; bowel sounds present  Extremities: WWP; no edema; radial/pedal pulses palpable  Neurological: AOx3; CNII-XII grossly intact; LEs 2/5 motor b/L w/ L slightly < R; UEs 5/5 b/L; SILT      MEDICATIONS:  MEDICATIONS  (STANDING):  allopurinol 100 milliGRAM(s) Oral daily  amLODIPine   Tablet 2.5 milliGRAM(s) Oral daily  amoxicillin    80 mG/mL Suspension 875 milliGRAM(s) Oral two times a day  atorvastatin 40 milliGRAM(s) Oral at bedtime  cyanocobalamin Injectable 1000 MICROGram(s) IntraMuscular daily  folic acid 1 milliGRAM(s) Oral daily  heparin  Injectable 5000 Unit(s) SubCutaneous every 8 hours  losartan 100 milliGRAM(s) Oral daily  multivitamin 1 Tablet(s) Oral daily  thiamine 100 milliGRAM(s) Oral daily    MEDICATIONS  (PRN):      ALLERGIES:  Allergies    No Known Allergies    Intolerances        LABS:                        10.7   9.9   )-----------( 340      ( 10 Dano 2018 06:48 )             33.3     01-10    135  |  99  |  11  ----------------------------<  118<H>  4.2   |  21<L>  |  0.93    Ca    8.9      10 Dano 2018 06:48  Mg     2.1     01-10            RADIOLOGY & ADDITIONAL TESTS: Reviewed. INTERVAL HPI/OVERNIGHT EVENTS:  No acute overnight events.     SUBJECTIVE: Patient seen and examined at bedside. Mildly lethargic this AM. Reports continued right-sided hip pain. Denies shortness of breath, chest pain, cough, dysuria.    ROS:  CV: Denies chest pain  Resp: Denies SOB  GI: Denies abdominal pain, constipation, diarrhea, nausea, vomiting  : Denies dysuria  ID: Denies fevers, chills  MSK: + joint pain     OBJECTIVE:    VITAL SIGNS:  ICU Vital Signs Last 24 Hrs  T(C): 36.6 (10 Dano 2018 04:57), Max: 37.2 (10 Dano 2018 03:30)  T(F): 97.9 (10 Dano 2018 04:57), Max: 98.9 (10 Dano 2018 03:30)  HR: 79 (10 Dano 2018 04:57) (76 - 80)  BP: 160/89 (10 Dano 2018 04:57) (133/61 - 160/89)  BP(mean): --  ABP: --  ABP(mean): --  RR: 17 (10 Dano 2018 04:57) (16 - 18)  SpO2: 99% (10 Dano 2018 04:57) (97% - 99%)        PHYSICAL EXAM:  General: resting in bed comfortably, lethargic  Eyes: EOMI  ENMT: MMM, no oropharyngeal erythema/exudates  Respiratory: CTAB, no rales/rhonchi/crackles/wheezes  Cardiovascular: RRR; S1/S2, no murmurs/rubs/clicks/gallops  Gastrointestinal: soft, NT/ND, BS X 4 present  Extremities: no edema b/l, WWP  Neuro: alert and oriented      MEDICATIONS:  MEDICATIONS  (STANDING):  allopurinol 100 milliGRAM(s) Oral daily  amLODIPine   Tablet 2.5 milliGRAM(s) Oral daily  amoxicillin    80 mG/mL Suspension 875 milliGRAM(s) Oral two times a day  atorvastatin 40 milliGRAM(s) Oral at bedtime  cyanocobalamin Injectable 1000 MICROGram(s) IntraMuscular daily  folic acid 1 milliGRAM(s) Oral daily  heparin  Injectable 5000 Unit(s) SubCutaneous every 8 hours  losartan 100 milliGRAM(s) Oral daily  multivitamin 1 Tablet(s) Oral daily  thiamine 100 milliGRAM(s) Oral daily    MEDICATIONS  (PRN):      ALLERGIES:  Allergies    No Known Allergies    Intolerances        LABS:                        10.7   9.9   )-----------( 340      ( 10 Dano 2018 06:48 )             33.3     01-10    135  |  99  |  11  ----------------------------<  118<H>  4.2   |  21<L>  |  0.93    Ca    8.9      10 Dano 2018 06:48  Mg     2.1     01-10            RADIOLOGY & ADDITIONAL TESTS: Reviewed.

## 2018-01-10 NOTE — PROGRESS NOTE ADULT - PROBLEM SELECTOR PLAN 3
BP better controlled, today systolic in 150s-160s. Per outpatient PMD Dr. Romero Alvarado Diltiazem given for HTN  -Norvasc 2.5mg  - Add home ARB (Olmesartan) PRN HTN  - Unclear why patient on Diltiazem -- considering discontinuation BP better controlled, today systolic in 150s-160s. Per outpatient PMD Dr. Romero Alvarado Diltiazem was given for HTN  -Norvasc 2.5mg  - On home ARB (Olmesartan) PRN HTN -> on losartan 100 daily inpatient.  - discontinued diltiazem

## 2018-01-10 NOTE — DISCHARGE NOTE ADULT - PLAN OF CARE
Continue amoxicillin You were found to have a urinary tract infection on this admission that resolved. You were initially treated with ceftriaxone, and transitioned to amoxicillin on 1/8 based on sensitivity of the organism to the antibiotic. Please continue to take your amoxicillin twice daily until 1/13. You were found to have mild cervical stenosis, and lumbar degenerative disease on your MRIs. Per neurosurgery, it is recommended that you continue with physical therapy and follow up with Dr. Rincon as an outpatient. Your systolic blood pressure was in the 150s-160s on this admission. Please continue to take your home Amlodipine and Olmesartan as prescribed, and follow up with your outpatient provider regarding further management. Please continue to take your home statin and follow up with your outpatient provider regarding further management of this condition. You were found to have a deficiency of Vitamin B12. Please continue to take an IM injection once tomorrow (1/11), then once weekly for 4 weeks, and then 1000mcg orally daily thereafter.

## 2018-01-10 NOTE — DISCHARGE NOTE ADULT - CARE PLAN
Principal Discharge DX:	UTI (urinary tract infection)  Goal:	Continue amoxicillin  Instructions for follow-up, activity and diet:	You were found to have a urinary tract infection on this admission that resolved. You were initially treated with ceftriaxone, and transitioned to amoxicillin on 1/8 based on sensitivity of the organism to the antibiotic. Please continue to take your amoxicillin twice daily until 1/13.  Secondary Diagnosis:	Spinal stenosis  Instructions for follow-up, activity and diet:	You were found to have mild cervical stenosis, and lumbar degenerative disease on your MRIs. Per neurosurgery, it is recommended that you continue with physical therapy and follow up with Dr. Rincon as an outpatient.  Secondary Diagnosis:	HTN (hypertension)  Instructions for follow-up, activity and diet:	Your systolic blood pressure was in the 150s-160s on this admission. Please continue to take your home Amlodipine and Olmesartan as prescribed, and follow up with your outpatient provider regarding further management.  Secondary Diagnosis:	HLD (hyperlipidemia)  Instructions for follow-up, activity and diet:	Please continue to take your home statin and follow up with your outpatient provider regarding further management of this condition.  Secondary Diagnosis:	B12 deficiency Principal Discharge DX:	UTI (urinary tract infection)  Goal:	Continue amoxicillin  Instructions for follow-up, activity and diet:	You were found to have a urinary tract infection on this admission that resolved. You were initially treated with ceftriaxone, and transitioned to amoxicillin on 1/8 based on sensitivity of the organism to the antibiotic. Please continue to take your amoxicillin twice daily until 1/13.  Secondary Diagnosis:	Spinal stenosis  Instructions for follow-up, activity and diet:	You were found to have mild cervical stenosis, and lumbar degenerative disease on your MRIs. Per neurosurgery, it is recommended that you continue with physical therapy and follow up with Dr. Rincon as an outpatient.  Secondary Diagnosis:	HTN (hypertension)  Instructions for follow-up, activity and diet:	Your systolic blood pressure was in the 150s-160s on this admission. Please continue to take your home Amlodipine and Olmesartan as prescribed, and follow up with your outpatient provider regarding further management.  Secondary Diagnosis:	HLD (hyperlipidemia)  Instructions for follow-up, activity and diet:	Please continue to take your home statin and follow up with your outpatient provider regarding further management of this condition.  Secondary Diagnosis:	B12 deficiency  Instructions for follow-up, activity and diet:	You were found to have a deficiency of Vitamin B12. Please continue to take an IM injection once tomorrow (1/11), then once weekly for 4 weeks, and then 1000mcg orally daily thereafter.

## 2018-01-10 NOTE — DISCHARGE NOTE ADULT - CARE PROVIDER_API CALL
Loki Rincon), Neurosurgery  [address], [place] [facility]ly [facility]  Phone: [phone]  Fax: [phone]

## 2018-01-11 LAB
CULTURE RESULTS: SIGNIFICANT CHANGE UP
CULTURE RESULTS: SIGNIFICANT CHANGE UP
SPECIMEN SOURCE: SIGNIFICANT CHANGE UP
SPECIMEN SOURCE: SIGNIFICANT CHANGE UP

## 2018-01-12 DIAGNOSIS — M48.02 SPINAL STENOSIS, CERVICAL REGION: ICD-10-CM

## 2018-01-12 DIAGNOSIS — E51.2 WERNICKE'S ENCEPHALOPATHY: ICD-10-CM

## 2018-01-12 DIAGNOSIS — A41.9 SEPSIS, UNSPECIFIED ORGANISM: ICD-10-CM

## 2018-01-12 DIAGNOSIS — Z96.649 PRESENCE OF UNSPECIFIED ARTIFICIAL HIP JOINT: ICD-10-CM

## 2018-01-12 DIAGNOSIS — G92 TOXIC ENCEPHALOPATHY: ICD-10-CM

## 2018-01-12 DIAGNOSIS — Z91.19 PATIENT'S NONCOMPLIANCE WITH OTHER MEDICAL TREATMENT AND REGIMEN: ICD-10-CM

## 2018-01-12 DIAGNOSIS — G95.20 UNSPECIFIED CORD COMPRESSION: ICD-10-CM

## 2018-01-12 DIAGNOSIS — B95.2 ENTEROCOCCUS AS THE CAUSE OF DISEASES CLASSIFIED ELSEWHERE: ICD-10-CM

## 2018-01-12 DIAGNOSIS — N47.1 PHIMOSIS: ICD-10-CM

## 2018-01-12 DIAGNOSIS — I16.9 HYPERTENSIVE CRISIS, UNSPECIFIED: ICD-10-CM

## 2018-01-12 DIAGNOSIS — N39.0 URINARY TRACT INFECTION, SITE NOT SPECIFIED: ICD-10-CM

## 2018-01-12 DIAGNOSIS — Z87.891 PERSONAL HISTORY OF NICOTINE DEPENDENCE: ICD-10-CM

## 2018-01-12 DIAGNOSIS — E78.5 HYPERLIPIDEMIA, UNSPECIFIED: ICD-10-CM

## 2018-01-12 DIAGNOSIS — F10.20 ALCOHOL DEPENDENCE, UNCOMPLICATED: ICD-10-CM

## 2018-01-12 DIAGNOSIS — E53.8 DEFICIENCY OF OTHER SPECIFIED B GROUP VITAMINS: ICD-10-CM

## 2018-01-12 DIAGNOSIS — I10 ESSENTIAL (PRIMARY) HYPERTENSION: ICD-10-CM

## 2018-01-12 DIAGNOSIS — F03.90 UNSPECIFIED DEMENTIA WITHOUT BEHAVIORAL DISTURBANCE: ICD-10-CM

## 2018-01-12 DIAGNOSIS — E86.0 DEHYDRATION: ICD-10-CM

## 2018-01-12 DIAGNOSIS — M47.896 OTHER SPONDYLOSIS, LUMBAR REGION: ICD-10-CM

## 2018-01-12 DIAGNOSIS — Z28.21 IMMUNIZATION NOT CARRIED OUT BECAUSE OF PATIENT REFUSAL: ICD-10-CM

## 2018-01-12 DIAGNOSIS — Z85.51 PERSONAL HISTORY OF MALIGNANT NEOPLASM OF BLADDER: ICD-10-CM

## 2018-01-12 DIAGNOSIS — R33.9 RETENTION OF URINE, UNSPECIFIED: ICD-10-CM

## 2018-01-12 DIAGNOSIS — G40.909 EPILEPSY, UNSPECIFIED, NOT INTRACTABLE, WITHOUT STATUS EPILEPTICUS: ICD-10-CM

## 2018-01-12 DIAGNOSIS — I24.8 OTHER FORMS OF ACUTE ISCHEMIC HEART DISEASE: ICD-10-CM

## 2018-01-12 DIAGNOSIS — R29.898 OTHER SYMPTOMS AND SIGNS INVOLVING THE MUSCULOSKELETAL SYSTEM: ICD-10-CM

## 2018-01-12 DIAGNOSIS — Z85.46 PERSONAL HISTORY OF MALIGNANT NEOPLASM OF PROSTATE: ICD-10-CM

## 2018-01-12 DIAGNOSIS — R41.0 DISORIENTATION, UNSPECIFIED: ICD-10-CM

## 2018-02-16 ENCOUNTER — APPOINTMENT (OUTPATIENT)
Dept: OPHTHALMOLOGY | Facility: CLINIC | Age: 83
End: 2018-02-16

## 2021-04-22 NOTE — ED ADULT NURSE NOTE - FALLEN IN THE PAST
Instructions: This plan will send the code FBSE to the PM system.  DO NOT or CHANGE the price.
Detail Level: Simple
Price (Do Not Change): 0.00
no
